# Patient Record
Sex: FEMALE | Race: BLACK OR AFRICAN AMERICAN | Employment: OTHER | URBAN - METROPOLITAN AREA
[De-identification: names, ages, dates, MRNs, and addresses within clinical notes are randomized per-mention and may not be internally consistent; named-entity substitution may affect disease eponyms.]

---

## 2019-01-28 ENCOUNTER — PATIENT OUTREACH (OUTPATIENT)
Dept: INTERNAL MEDICINE CLINIC | Age: 84
End: 2019-01-28

## 2019-01-28 NOTE — PROGRESS NOTES
NN received Staff Message sent to PCP from [facility] regarding patient being admitted inpatient at [facility].  
 Message states:   
Michelle Mora is requesting Dr. Annelise Denson call Pratibha Lubin () with [facility] [phone] in regards of the patient. Best contact is [phone]. SHABANA LM on  for both above # requesting call back.

## 2019-01-31 ENCOUNTER — HOSPITAL ENCOUNTER (EMERGENCY)
Age: 84
Discharge: HOME | End: 2019-01-31
Attending: EMERGENCY MEDICINE
Payer: MEDICARE

## 2019-01-31 ENCOUNTER — PATIENT OUTREACH (OUTPATIENT)
Dept: INTERNAL MEDICINE CLINIC | Age: 84
End: 2019-01-31

## 2019-01-31 VITALS
DIASTOLIC BLOOD PRESSURE: 46 MMHG | HEART RATE: 63 BPM | TEMPERATURE: 97.8 F | RESPIRATION RATE: 19 BRPM | OXYGEN SATURATION: 100 % | SYSTOLIC BLOOD PRESSURE: 130 MMHG

## 2019-01-31 DIAGNOSIS — R55 SYNCOPE, UNSPECIFIED SYNCOPE TYPE: Primary | ICD-10-CM

## 2019-01-31 LAB
ALBUMIN SERPL-MCNC: 3.4 G/DL (ref 3.5–5)
ALBUMIN/GLOB SERPL: 0.9 {RATIO} (ref 1.1–2.2)
ALP SERPL-CCNC: 78 U/L (ref 45–117)
ALT SERPL-CCNC: 16 U/L (ref 12–78)
ANION GAP SERPL CALC-SCNC: 10 MMOL/L (ref 5–15)
AST SERPL-CCNC: 14 U/L (ref 15–37)
ATRIAL RATE: 52 BPM
BASOPHILS # BLD: 0.1 K/UL (ref 0–0.1)
BASOPHILS NFR BLD: 1 % (ref 0–1)
BILIRUB SERPL-MCNC: 0.4 MG/DL (ref 0.2–1)
BUN SERPL-MCNC: 26 MG/DL (ref 6–20)
BUN/CREAT SERPL: 19 (ref 12–20)
CALCIUM SERPL-MCNC: 9 MG/DL (ref 8.5–10.1)
CALCULATED P AXIS, ECG09: 64 DEGREES
CALCULATED R AXIS, ECG10: 46 DEGREES
CALCULATED T AXIS, ECG11: 60 DEGREES
CHLORIDE SERPL-SCNC: 114 MMOL/L (ref 97–108)
CO2 SERPL-SCNC: 21 MMOL/L (ref 21–32)
COMMENT, HOLDF: NORMAL
CREAT SERPL-MCNC: 1.34 MG/DL (ref 0.55–1.02)
DIAGNOSIS, 93000: NORMAL
DIFFERENTIAL METHOD BLD: ABNORMAL
EOSINOPHIL # BLD: 0.2 K/UL (ref 0–0.4)
EOSINOPHIL NFR BLD: 2 % (ref 0–7)
ERYTHROCYTE [DISTWIDTH] IN BLOOD BY AUTOMATED COUNT: 13.6 % (ref 11.5–14.5)
GLOBULIN SER CALC-MCNC: 4 G/DL (ref 2–4)
GLUCOSE SERPL-MCNC: 111 MG/DL (ref 65–100)
HCT VFR BLD AUTO: 30.4 % (ref 35–47)
HGB BLD-MCNC: 9.5 G/DL (ref 11.5–16)
IMM GRANULOCYTES # BLD AUTO: 0 K/UL (ref 0–0.04)
IMM GRANULOCYTES NFR BLD AUTO: 0 % (ref 0–0.5)
LYMPHOCYTES # BLD: 2 K/UL (ref 0.8–3.5)
LYMPHOCYTES NFR BLD: 24 % (ref 12–49)
MCH RBC QN AUTO: 30 PG (ref 26–34)
MCHC RBC AUTO-ENTMCNC: 31.3 G/DL (ref 30–36.5)
MCV RBC AUTO: 95.9 FL (ref 80–99)
MONOCYTES # BLD: 0.5 K/UL (ref 0–1)
MONOCYTES NFR BLD: 6 % (ref 5–13)
NEUTS SEG # BLD: 5.6 K/UL (ref 1.8–8)
NEUTS SEG NFR BLD: 67 % (ref 32–75)
NRBC # BLD: 0 K/UL (ref 0–0.01)
NRBC BLD-RTO: 0 PER 100 WBC
P-R INTERVAL, ECG05: 112 MS
PLATELET # BLD AUTO: 267 K/UL (ref 150–400)
PMV BLD AUTO: 9.6 FL (ref 8.9–12.9)
POTASSIUM SERPL-SCNC: 4.4 MMOL/L (ref 3.5–5.1)
PROT SERPL-MCNC: 7.4 G/DL (ref 6.4–8.2)
Q-T INTERVAL, ECG07: 484 MS
QRS DURATION, ECG06: 70 MS
QTC CALCULATION (BEZET), ECG08: 450 MS
RBC # BLD AUTO: 3.17 M/UL (ref 3.8–5.2)
SAMPLES BEING HELD,HOLD: NORMAL
SODIUM SERPL-SCNC: 145 MMOL/L (ref 136–145)
TROPONIN I SERPL-MCNC: <0.05 NG/ML
VENTRICULAR RATE, ECG03: 52 BPM
WBC # BLD AUTO: 8.2 K/UL (ref 3.6–11)

## 2019-01-31 PROCEDURE — 99284 EMERGENCY DEPT VISIT MOD MDM: CPT

## 2019-01-31 PROCEDURE — 84484 ASSAY OF TROPONIN QUANT: CPT

## 2019-01-31 PROCEDURE — 97161 PT EVAL LOW COMPLEX 20 MIN: CPT

## 2019-01-31 PROCEDURE — 97530 THERAPEUTIC ACTIVITIES: CPT

## 2019-01-31 PROCEDURE — 97116 GAIT TRAINING THERAPY: CPT

## 2019-01-31 PROCEDURE — 36415 COLL VENOUS BLD VENIPUNCTURE: CPT

## 2019-01-31 PROCEDURE — 85025 COMPLETE CBC W/AUTO DIFF WBC: CPT

## 2019-01-31 PROCEDURE — 80053 COMPREHEN METABOLIC PANEL: CPT

## 2019-01-31 PROCEDURE — 93005 ELECTROCARDIOGRAM TRACING: CPT

## 2019-01-31 RX ORDER — GABAPENTIN 300 MG/1
300 CAPSULE ORAL 2 TIMES DAILY
COMMUNITY
End: 2019-04-16 | Stop reason: ALTCHOICE

## 2019-01-31 RX ORDER — DONEPEZIL HYDROCHLORIDE 10 MG/1
10 TABLET, FILM COATED ORAL
COMMUNITY
End: 2019-02-15 | Stop reason: SDUPTHER

## 2019-01-31 RX ORDER — LOSARTAN POTASSIUM 100 MG/1
100 TABLET ORAL DAILY
COMMUNITY
End: 2019-04-16 | Stop reason: ALTCHOICE

## 2019-01-31 RX ORDER — LABETALOL 200 MG/1
200 TABLET, FILM COATED ORAL 2 TIMES DAILY
COMMUNITY
End: 2019-03-04 | Stop reason: SDUPTHER

## 2019-01-31 RX ORDER — NIFEDIPINE 60 MG/1
60 TABLET, EXTENDED RELEASE ORAL DAILY
COMMUNITY
End: 2019-02-04

## 2019-01-31 RX ORDER — GUAIFENESIN 100 MG/5ML
81 LIQUID (ML) ORAL DAILY
COMMUNITY
End: 2019-04-16 | Stop reason: ALTCHOICE

## 2019-01-31 RX ORDER — POLYETHYLENE GLYCOL 3350 17 G/17G
17 POWDER, FOR SOLUTION ORAL
COMMUNITY
End: 2019-04-16 | Stop reason: ALTCHOICE

## 2019-01-31 RX ORDER — OMEPRAZOLE 20 MG/1
20 CAPSULE, DELAYED RELEASE ORAL DAILY
COMMUNITY
End: 2019-04-16 | Stop reason: ALTCHOICE

## 2019-01-31 NOTE — ED TRIAGE NOTES
Patient presents from home with complaints of ongoing dizziness that started a few weeks ago. Patient fell last week in [place] and was in the hospital until Saturday. Since patient has been discharged she is still dizzy and reports increased weakness at this time

## 2019-01-31 NOTE — ED PROVIDER NOTES
80 y.o. female with past medical history significant for HTN and stroke who presents from home with chief complaint of syncope. Pt's daughter reports Pt became dizzy while riding to their hair salon, so she lied flat in the backseat and rested her head on an armrest at that time. Per daughter, Pt was generally weak when she arrived to the salon; she required assistance from both her daughter and son to get inside to the salon chair. After sitting down, Pt slumped to one side in the chair, she was awake but \"unable to speak or answer questions\", and she was \"trembling all over\". Pt was then assisted back into the daughter's vehicle, then was brought to the ED. Per daughter, Pt has been admitted at [facility] twice in 1 month. The first admission was for a syncopal episode and fluctuating BP; she was then admitted for a R ankle fracture 12 days ago. Per daughter, Pt was discharged from [facility] 6 days ago and she arrived to [place] 5 days ago. In addition, Pt was recently on abx for UTI. Pt's daughter reports Pt had a R-sided stroke in December 2014. There are no other acute medical concerns at this time. PCP: Lisha Turcios MD 
 
Note written by Jose A Armendariz, as dictated by Warren Cárdenas MD 3:00 PM 
 
 
 
  
 
Past Medical History:  
Diagnosis Date  Hypertension History reviewed. No pertinent surgical history. History reviewed. No pertinent family history. Social History Socioeconomic History  Marital status: UNKNOWN Spouse name: Not on file  Number of children: Not on file  Years of education: Not on file  Highest education level: Not on file Social Needs  Financial resource strain: Not on file  Food insecurity - worry: Not on file  Food insecurity - inability: Not on file  Transportation needs - medical: Not on file  Transportation needs - non-medical: Not on file Occupational History  Not on file Tobacco Use  
  Smoking status: Never Smoker Substance and Sexual Activity  Alcohol use: No  
 Drug use: No  
 Sexual activity: Not Currently Other Topics Concern  Not on file Social History Narrative  Not on file ALLERGIES: Pcn [penicillins] Review of Systems Constitutional: Negative for appetite change, chills and fever. HENT: Negative for rhinorrhea, sore throat and trouble swallowing. Eyes: Negative for photophobia. Respiratory: Negative for cough and shortness of breath. Cardiovascular: Negative for chest pain and palpitations. Gastrointestinal: Negative for abdominal pain, nausea and vomiting. Genitourinary: Negative for dysuria, frequency and hematuria. Musculoskeletal: Negative for arthralgias. Neurological: Positive for dizziness, tremors, speech difficulty and weakness. Psychiatric/Behavioral: Negative for behavioral problems. The patient is not nervous/anxious. All other systems reviewed and are negative. Vitals:  
 01/31/19 1439 01/31/19 1530 BP: 112/47 116/43 Pulse: (!) 58 63 Resp: 20 18 Temp: 97.5 °F (36.4 °C) SpO2: 100% 98% Physical Exam  
Constitutional: She appears well-developed and well-nourished. Plastic boot covering R foot. HENT:  
Head: Normocephalic and atraumatic. Mouth/Throat: Oropharynx is clear and moist.  
(+) Thrush on tongue. Eyes: EOM are normal. Pupils are equal, round, and reactive to light. Neck: Normal range of motion. Neck supple. Cardiovascular: Regular rhythm, normal heart sounds and intact distal pulses. Bradycardia present. Exam reveals no gallop and no friction rub. No murmur heard. Pulse rate 55. Pulmonary/Chest: Effort normal. No respiratory distress. She has no wheezes. She has no rales. Abdominal: Soft. There is no tenderness. There is no rebound. Musculoskeletal: Normal range of motion. She exhibits no tenderness. Neurological: She is alert. No cranial nerve deficit. Motor; symmetric Skin: No erythema. Psychiatric: She has a normal mood and affect. Her behavior is normal.  
Nursing note and vitals reviewed. Note written by Jose A Montgomery, as dictated by Larri Dubin, MD 3:00 PM 
 
MDM Procedures ED EKG interpretation: 
Rhythm: sinus bradycardia; and regular . Rate (approx.): 50; Axis: normal; P wave: normal; QRS interval: normal ; ST/T wave: normal; in  Lead: ; Other findings: . This EKG was interpreted by General Luc MD,ED Provider. 3:07 PM 
 
 
Note: Patient is here with her daughter. She had been living with her daughter in [place]. She was discharged from the hospital there 6 days ago. She's had two admissions for spells that sound like syncope in the last month. Patient started feeling poorly in the car on the way to the . She put her head down on the armrest. Her daughter and son helped her in to the Group-[facility] parlor. Patient then had a spell that sounds like a faint. She appears to be back to normal now. Neurologic exam is normal and she is completely oriented. Vital signs are normal. I do not hear a heart murmur. Daughter reports that due to her frequent spells that she will need to be placed.  will be consulted. Labs will be sent. Her vital signs will be continued to be monitored. Pulse rate an EKG was 50 but she appears to be taking labetalol. There was no trauma when she had her spell today although she broke her ankle recently.  
General Luc MD 
3:13 PM

## 2019-01-31 NOTE — PROGRESS NOTES
Admission Medication Reconciliation: 
 
Information obtained from: discharge papers from [facility] dated 1/26/19 Significant PMH/Disease States:  
Past Medical History:  
Diagnosis Date  Hypertension Chief Complaint for this Admission:  dizziness, weakness Allergies:  Pcn [penicillins] Prior to Admission Medications:  
Prior to Admission Medications Prescriptions Last Dose Informant Patient Reported? Taking? NIFEdipine ER (PROCARDIA XL) 60 mg ER tablet   Yes Yes Sig: Take 60 mg by mouth daily. aspirin 81 mg chewable tablet   Yes Yes Sig: Take 81 mg by mouth daily. donepezil (ARICEPT) 10 mg tablet   Yes Yes Sig: Take 10 mg by mouth nightly.  
gabapentin (NEURONTIN) 300 mg capsule   Yes Yes Sig: Take 300 mg by mouth two (2) times a day. labetalol (NORMODYNE) 200 mg tablet   Yes Yes Sig: Take 200 mg by mouth two (2) times a day. losartan (COZAAR) 100 mg tablet   Yes Yes Sig: Take 100 mg by mouth daily. methyl salicylate/menthol (CARSON BYERS EX)   Yes Yes Sig: by Apply Externally route three (3) times daily as needed for Pain.  
montelukast (SINGULAIR) 10 mg tablet   Yes Yes Sig: Take 10 mg by mouth daily. multivitamins-minerals-lutein (CENTRUM SILVER) Tab   Yes Yes Sig: Take 1 Tab by mouth. omeprazole (PRILOSEC) 20 mg capsule   Yes Yes Sig: Take 20 mg by mouth daily. polyethylene glycol (MIRALAX) 17 gram packet   Yes Yes Sig: Take 17 g by mouth daily as needed. Facility-Administered Medications: None Comments/Recommendations: Medication review done with discharge paperwork from [facility] dated 1/26/19 Copy left for scanning. Removed amlodipine, carvedilol, levetiracetam, valsartan, hydrcortisone cream 
Added aspirin, donepezil, gabapentin, labetalol, losartan, nifedipine, omeprazole, Carson Byers 
allergies reviewed. Consider multiple medications as possible causes for dizziness.

## 2019-01-31 NOTE — PROGRESS NOTES
Date of previous inpatient admission/ ED visit? 12/15/14-12/19/18 What brought the patient back to ED? Patient presents to the ED w/ dizziness Did patient decline recommended services during last admission/ ED visit (if yes, what)? No 
 
Has patient seen a provider since their last inpatient admission/ED visit (if yes, when)? Yes. Patient has been residing in [facility] since 2014 and has been receiving care. Recent hospital d/c 1/26/19 CM Interventions: 
From previous inpatient admission/ED visit:NA From current inpatient admission/ED visit: Assessment SSED/CM consult received and appreciated. EMR reviewed. History significant for HTN and  CVA. Patient presents to the ED w/ dizziness. Initially met w/ Ms. Domenico Last as daughter to return back. Introduced to role of CM. Patient states was in the car and wasn't able to keep her head up noted leaning to the side. Later had episode in hair salon. Daughter Adeola Dumont arrived and provided additional information patient brought from [facility] to be with rest of family.  had resided in [place] since 2014 w/ daughter however now Adeola Dumont experiencing health challenges and prefers for her mother to be in [place] w/ Nanette Harris son/MPOA and rest of family members. 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  states [employer] to switch from [place] to [facility] at midnight. Provided CM w/ paperwork and verified subscriber number is the same [id number] however RX numbers are different (effective 1/1/19). Daughter to return to [facility] on 2/6/19 and trying to assist w/ processes before departure. DME includes w/c and cane. Discussed LTC resources after possible placement. CM asked about private resources for NH - Ms. Domenico Last does not have a LTC policy and would need to apply for medicaid for NH. This writer discussed the need to apply for medicaid and process will take 45 days for decision. Noted recommendations by Kaylee Eaton. CM provided list of [facility] agencies / senior connections/ and SNFs. Contact made w/ 's office only facility MD has privileges is [facility] - family expressed distance and ability for family to reach is important. Discussed [facility] however informed [employer] would only review one disposition SNF versus [facility].  elects to pursue SNF will discuss choices w/ Cheng and contact CM on 2/1/19. Home Health and SNF Lists provided and Freedom Letter reviewed and signed. Contact made w/ SHABANA Hernadez. Copy of AMD obtained and scanned in MR. No additional transitional care needs verbalized. Care Management Interventions PCP Verified by CM: Yes Last Visit to PCP: 11/10/14 Palliative Care Criteria Met (RRAT>21 & CHF Dx)?: No 
Transition of Care Consult (CM Consult): Discharge Planning MyChart Signup: No 
Discharge Durable Medical Equipment: No 
Health Maintenance Reviewed: Yes Physical Therapy Consult: Yes Occupational Therapy Consult: No 
Speech Therapy Consult: No 
Current Support Network: Relative's Home Confirm Follow Up Transport: Family Plan discussed with Pt/Family/Caregiver: Yes Freedom of Choice Offered: Yes [employer] Information Provided?: No 
Discharge Location Discharge Placement: Home with home health

## 2019-01-31 NOTE — PROGRESS NOTES
[address] [facility] Support & Risk Prevention:  Fainting Goals Addressed This Visit's Progress  Facilitate transitions of care -Emergency Care. On track 1/31/2019:  NN spoke with POA Daughter Tiffanie Wills who c/o patient just fainted \"again\"; that they are headed to ED due to episode. Stated she realizes PCP recommends [facility] but she has to do what is best for the family (son) who will care for patient after her leaving to go back to [facility] in 2 days. States patient's son lives in [place] and she has to think of money, milage & time for family to see patient frequently when and where admitted including post d/c Rehab. NN explained the location and Practice protocol if patient is admitted to [facility]; pt would be followed by partner Dr. Vane Villanueva; That if patient attends [facility] and if required SNF, referral could be make by PCP to facility of choice in [facility] pending Payor's agreement. Family member states she is contacting Payor to if [facility], etc  on the list. Also informed if patient is admitted to [facility], patient w/ be followed by PCP. States she will decide based on distance, money and location best for the brother/family now assuming respoonsiblity of patient. Consult done w/ PCP. Goal completion:  Pending ED visit

## 2019-01-31 NOTE — SENIOR SERVICES NOTE
physical Therapy Emergency Department EVALUATION Patient: Ayleen López (89 y.o. female) Date: 1/31/2019 Primary Diagnosis: No admission diagnoses are documented for this encounter. Precautions: Fall risk ASSESSMENT : 
Chart reviewed. Patient cleared to be seen by ER MD. Patient presents with dizziness. Difficult social history. From [place] where she was recently hospitalized s/p R ankle fracture. She is in a walking boot. Daughter present who has medical concerns of her own, prompting the move to [facility] to be with other family members. Patient orthostatic in standing. With LE exercise able to recover and ambulate in the hallway with the RW. Patient is unsteady with the cane and encouraged to use RW at home. No pain with walking. Slow but steady gait pattern with the device. Of note, daughter reporting that the patient c/o of calf pain on the R. Walking boot removed. No pain, redness, or swelling observed. Readjusted straps on the boot. Educated on safe stair negotiation as well as sitting and standing two full minutes before moving away from the bed to avoid anymore orthostatic episodes. Discussed with MD and case management. Recommend home with continued family support and HHPT/OT. PLAN : 
Frequency/Duration: Pending admission, the patient will be followed by physical therapy 5 times a week to address goals. If admitted, Recommendations and Planned Interventions: 
[x]           Bed Mobility Training             []    Neuromuscular Re-Education 
[x]           Transfer Training                   []    Orthotic/Prosthetic Training 
[x]           Gait Training                         []    Modalities [x]           Therapeutic Exercises           []    Edema Management/Control 
[x]           Therapeutic Activities            []    Patient and Family Training/Education 
[]           Other (comment): 
 
 
Discharge Recommendations:  
 
[x]   Home with family []   Skilled nursing facility []   Admission to hospital with rehab likely needed 
[]   Inpatient rehab referral 
[]   Outpatient physical therapy referral 
[x]   Other: HHPT and OT Further Equipment Recommendations for Discharge:  Patient encouraged to use RW rather than the cane secondary to high fall risk [x]   Rolling walker with 5\" wheels 
[]   Crutches  
[]   Cane  
[]   Wheelchair  
[]   Other: COMMUNICATION/EDUCATION:  
Communication/Collaboration: 
[]   Fall prevention education was provided and the patient/caregiver indicated understanding. []   Patient/family have participated as able and agree with findings and recommendations. []   Patient is unable to participate in plan of care at this time. Findings and recommendations were discussed with: MD physician and  SUBJECTIVE:  
Patient stated I am doing the best that I can.  OBJECTIVE DATA SUMMARY:  
HISTORY:   
Past Medical History:  
Diagnosis Date  Hypertension History reviewed. No pertinent surgical history. Prior Level of Function/Home Situation: Patient recently moved to [place] post hospitalization in [place]. Living with family members who provide constant support. Ambulates with a cane and R walking boot s/p fracture on R ankle in [place] from a fall. Personal factors and/or comorbidities impacting plan of care:  
 
Home Situation Home Environment: Private residence # Steps to Enter: 3 Rails to Enter: Yes Hand Rails : Bilateral 
One/Two Story Residence: One story Living Alone: No 
Support Systems: Family member(s) Patient Expects to be Discharged to[de-identified] Private residence Current DME Used/Available at Home: Walker, rolling, Cane, straight EXAMINATION/PRESENTATION/DECISION MAKING:  
Hearing: Auditory Auditory Impairment: None Strength:   
Strength: Generally decreased, functional 
  
Tone & Sensation:  
Tone: Normal 
Sensation: Intact Coordination: 
Coordination: Within functional limits Functional Mobility: Bed Mobility: 
Supine to Sit: Contact guard assistance Sit to Supine: Contact guard assistance Transfers: 
Sit to Stand: Contact guard assistance; Adaptive equipment; Additional time Stand to Sit: Contact guard assistance Balance:  
Sitting: Intact Standing: Impaired Standing - Static: Fair;Constant support Standing - Dynamic : Fair Ambulation/Gait Training: 
Distance (ft): 60 Feet (ft) Assistive Device: Gait belt;Walker, rolling Ambulation - Level of Assistance: Contact guard assistance; Additional time; Adaptive equipment Gait Abnormalities: Decreased step clearance Base of Support: Narrowed Speed/Sarai: Slow Special Tests: 
10 Meter walk test:  (Specify if any supplemental oxygen is used, the type, pre, during and post sats.) Self-Selected Or Fast-Velocity: Self Selected Velocity Trial 1 (Time to Walk 10 Meters): 29 Seconds Trial 2 (Time to Walk 10 Meters): 29 Seconds Trial 3 (Time to Walk 10 Meters): 29 Seconds Average : 29 Seconds Score (Meters/Second): 0.3 Meters/Second Minimal Detectable Change (MDC-90) = 0.1 m/s Jeff NAZARIO. \"Comfortable and maximum walking speed of adults aged 20-79 years: reference values and determinants. \" Age and Agin Volume 26(1):15-9. Emily Falk. \"Age- and gender-related test performance in community-dwelling elderly people: Six-Minute Walk Test, Bui Balance Scale, Timed Up & Go Test, and gait speeds. \" Physical Therapy: 2002 Volume 82(2):128-37. Alice Nobles DM, Suzette PW, Lashon Monson GARRETT, [facility] MONISHA. \"Assessing stability and change of four performance measures: a longitudinal study evaluating outcome following total hip and knee arthroplasty. \" [facility] Musculoskeletal Disorders: 2005 Volume 6(3). Sridhar Mendoza, PhD; Feli Gomez, PhD. Lindy Sanchez Paper: \"Walking Speed: the Sixth Vital Sign\" Journal of Geriatric Physical Therapy: 2009 - Volume 32 - Issue 2 - p 25 . Physical Therapy Evaluation Charge Determination History Examination Presentation Decision-Making MEDIUM  Complexity : 1-2 comorbidities / personal factors will impact the outcome/ POC  LOW Complexity : 1-2 Standardized tests and measures addressing body structure, function, activity limitation and / or participation in recreation  LOW Complexity : Stable, uncomplicated  LOW Complexity : FOTO score of  Based on the above components, the patient evaluation is determined to be of the following complexity level: LOW Pain: 
Pain Scale 1: Numeric (0 - 10) Pain Intensity 1: 0 Activity Tolerance:  
Orthostatic. BP from 112/52 in sitting to 87/52 in standing. Patient reporting some \"funny feeling\". Seated and with leg exercises about to recover and ambulate. Please refer to the flowsheet for vital signs taken during this treatment. After treatment:  
[]   Patient left in no apparent distress sitting up in chair 
[]   Patient left in no apparent distress in bed 
[]   Call bell left within reach 
[]   Nursing notified 
[]   Caregiver present 
[]   Bed alarm activated Thank you for this referral. 
Hilary Montano, PT Time Calculation: 35 mins

## 2019-01-31 NOTE — DISCHARGE INSTRUCTIONS
Patient Education        Fainting: Care Instructions  Your Care Instructions    When you faint, or pass out, you lose consciousness for a short time. A brief drop in blood flow to the brain often causes it. When you fall or lie down, more blood flows to your brain and you regain consciousness. Emotional stress, pain, or overheating--especially if you have been standing--can make you faint. In these cases, fainting is usually not serious. But fainting can be a sign of a more serious problem. Your doctor may want you to have more tests to rule out other causes. The treatment you need depends on the reason why you fainted. The doctor has checked you carefully, but problems can develop later. If you notice any problems or new symptoms, get medical treatment right away. Follow-up care is a key part of your treatment and safety. Be sure to make and go to all appointments, and call your doctor if you are having problems. It's also a good idea to know your test results and keep a list of the medicines you take. How can you care for yourself at home? · Drink plenty of fluids to prevent dehydration. If you have kidney, heart, or liver disease and have to limit fluids, talk with your doctor before you increase your fluid intake. When should you call for help? Call 911 anytime you think you may need emergency care. For example, call if:    · You have symptoms of a heart problem. These may include:  ? Chest pain or pressure. ? Severe trouble breathing. ? A fast or irregular heartbeat. ? Lightheadedness or sudden weakness. ? Coughing up pink, foamy mucus. ? Passing out. After you call 911, the  may tell you to chew 1 adult-strength or 2 to 4 low-dose aspirin. Wait for an ambulance. Do not try to drive yourself.     · You have symptoms of a stroke. These may include:  ? Sudden numbness, tingling, weakness, or loss of movement in your face, arm, or leg, especially on only one side of your body.   ? Sudden vision changes. ? Sudden trouble speaking. ? Sudden confusion or trouble understanding simple statements. ? Sudden problems with walking or balance. ? A sudden, severe headache that is different from past headaches.     · You passed out (lost consciousness) again.    Watch closely for changes in your health, and be sure to contact your doctor if:    · You do not get better as expected. Where can you learn more? Go to http://rosalina-bonifacio.info/. Enter J151 in the search box to learn more about \"Fainting: Care Instructions. \"  Current as of: September 23, 2018  Content Version: 11.9  © 3989-3404 [employer]. Care instructions adapted under license by [employer] (which disclaims liability or warranty for this information). If you have questions about a medical condition or this instruction, always ask your healthcare professional. [address] any warranty or liability for your use of this information.

## 2019-01-31 NOTE — SENIOR SERVICES NOTE
Face to Face Encounter Patients Name: Asia Roe    YOB: 1929 Primary Diagnosis: dizziness Date of Face to Face:   1/31/2019 Face to Face Encounter findings are related to primary reason for home care:   yes 1. I certify that the patient needs intermittent skilled nursing care, physical therapy and/or speech therapy. I will not be following this patient in the Community and Dr. Charisma Garcia will be responsible for signing the [address] 2. Initial Orders for Care: Must be completed only if Face to Face MD will not be signing the [address] Physical Therapy, Occupational Therapy and Medical  3. I certify that this patient is homebound for the following reason(s): Requires considerable and taxing effort to leave the home  and Requires the assistance of 1 or more persons to leave the home 4. I certify that this patient is under my care and that I, or a nurse practitioner or [phone] working with me, had a Face-to-Face Encounter that meets the physician Face-to-Face Encounter requirements. Document the physical findings from the [address] [place] [address] Street Encounter that support the need for skilled services: Needs skilled safety assessment and interventions  and Has new finding of weakness and altered mobility that requires skilled physical/occupational and/or speech therapy services for evaluation and interventions. Dawna Valdez, PT 
1/31/2019

## 2019-02-01 ENCOUNTER — TELEPHONE (OUTPATIENT)
Dept: CASE MANAGEMENT | Age: 84
End: 2019-02-01

## 2019-02-01 ENCOUNTER — PATIENT OUTREACH (OUTPATIENT)
Dept: INTERNAL MEDICINE CLINIC | Age: 84
End: 2019-02-01

## 2019-02-01 ENCOUNTER — DOCUMENTATION ONLY (OUTPATIENT)
Dept: CASE MANAGEMENT | Age: 84
End: 2019-02-01

## 2019-02-01 NOTE — TELEPHONE ENCOUNTER
Spoke w/ MPOA /Daughter Casey Thomason to discuss options - states cancelled MD appointment when thought patient was to be admitted into the hospital. CM verbalized will need PCP to follow regarding [facility] orders. CM attempted to look at other options for patient.  verbalizes patient was to be placed in [facility] however plan was to return back to [place] to be w/ family and previously had 3 night hospitalization. Ms. Perico Hemphill has active PCP in [facility]. CM asked about additional family to provide assist in the home during processes (retired / college student) - no additional available family. Daughter questions if should return back to [facility] w/ resources and re attempt processes there. CM acknowledges IPR is not a LTC solution average stay 1-2 weeks. No present [facility] services including therapy recommending LOC needs. Patient evaluated in the ED on 1/31/19. CM asked daughter to contact Hannah De Guzman regarding available appointments for today/ Monday for possible HH orders. Obtained choice of facilities - Haris Rickey communicated w/ brother Kelsy Calderón 1/ [address] 2/[facility] and [facility]. Informed insurance would need documentation for authorization. Asked patient's monthly income $1400 Social Security. Requested Ms. Parra to initiate medicaid application today informed private pay for SNFs >$6000/month. Permission obtained to send referral to [facility] and will obtain rates and communicate back to [facility]. Call placed to [facility] - spoke w/ Alpha Deniz [phone] discussed case and willing to review. Non skill rates start at $298/day private upfront monthly $15,000.

## 2019-02-01 NOTE — TELEPHONE ENCOUNTER
Call placed to [place] Department at [facility] [phone] message left for Marielena Vargas CM (outcome pending).

## 2019-02-01 NOTE — PROGRESS NOTES
Call received from Daniel Smyth, [address] Liaison informed [facility] unable to follow family declined PCP visit no MD to follow.

## 2019-02-01 NOTE — PROGRESS NOTES
Contact made w/ [facility] Liaison Corey BOWIE. Order not seen in system CM will need to resend. Referral placed in CC today. Emergency contacts daughter/ Jose Lowe [phone] and son/ Meena Altamiranoes [phone]

## 2019-02-01 NOTE — PROGRESS NOTES
NNTOCED [facility]M 2019:  Dizziness/Falling/Syncope Hospital Discharge Follow-Up Date/Time:  2019 3:18 PM 
 
Patient was admitted to [facility] ED on 2019 and discharged on 2019 for Syncope- Neurological: Positive for dizziness, tremors, speech difficulty and weakness . The physician discharge summary was available at the time of outreach. Patient was contacted within 2 business days of discharge. Top Challenges reviewed with the provider Office Visit Patient d/c from [facility] in [facility] 7 days ago ACP needs updating Method of communication with provider :face to face Inpatient RRAT score: 5 Was this a readmission? no  
Patient stated reason for the readmission: n/a Nurse Navigator (NN) contacted the family by telephone to perform post hospital discharge assessment. Verified name and  with family as identifiers. Provided introduction to self, and explanation of the Nurse Navigator role. Reviewed discharge instructions and red flags with family who verbalized understanding. Family given an opportunity to ask questions and does not have any further questions or concerns at this time. The family agrees to contact the PCP office for questions related to their healthcare. NN provided contact information for future reference. Disease Specific:   none Summary of patient's top problems: 1. Syncope episodes Home Health orders at discharge: none Home Health company: no 
Date of initial visit: n/a Durable Medical Equipment ordered/company: no 
Durable Medical Equipment received: n/a Barriers to care? none Advance Care Planning:  
Does patient have an Advance Directive: On file-needs updating. Medication(s):  
New Medications at Discharge: none-the patient was taking the same prior to admissions. Changed Medications at Discharge: n/a Discontinued Medications at Discharge: non3 Medication reconciliation was performed with family, who verbalizes understanding of administration of home medications. There were no barriers to obtaining medications identified at this time. Referral to Pharm D needed: no  
 
Current Outpatient Medications Medication Sig  
 aspirin 81 mg chewable tablet Take 81 mg by mouth daily.  NIFEdipine ER (PROCARDIA XL) 60 mg ER tablet Take 60 mg by mouth daily.  labetalol (NORMODYNE) 200 mg tablet Take 200 mg by mouth two (2) times a day.  donepezil (ARICEPT) 10 mg tablet Take 10 mg by mouth nightly.  gabapentin (NEURONTIN) 300 mg capsule Take 300 mg by mouth two (2) times a day.  losartan (COZAAR) 100 mg tablet Take 100 mg by mouth daily.  omeprazole (PRILOSEC) 20 mg capsule Take 20 mg by mouth daily.  polyethylene glycol (MIRALAX) 17 gram packet Take 17 g by mouth daily as needed.  methyl salicylate/menthol (CARSON BYERS EX) by Apply Externally route three (3) times daily as needed for Pain.  montelukast (SINGULAIR) 10 mg tablet Take 10 mg by mouth daily.  multivitamins-minerals-lutein (CENTRUM SILVER) Tab Take 1 Tab by mouth. No current facility-administered medications for this visit. There are no discontinued medications. [facility] follow up appointment(s): No future appointments. Non-[facility] follow up appointment(s): no 
[facility]:  n/a  
 
 
Goals  Attends follow up appointments on schedule 2/1/2019:  Patient scheduled for JOANNA visit 2/4/2019. EW  
  
  Facilitate transitions of care -Emergency Care.   
  1/31/2019:  NN spoke with POA Daughter Eddi who c/o patient just fainted \"again\"; that they are headed to ED due to episode. Stated she realizes PCP recommends [facility] but she has to do what is best for the family (son) who will care for patient after her leaving to go back to [facility] in 2 days.   States patient's son lives in [place] and she has to think of money, milage & time for family to see patient frequently when and where admitted including post d/c Rehab. NN explained the location and Practice protocol if patient is admitted to [facility]; pt would be followed by partner Dr. Elodia Abad; That if patient attends [address] [place] [address] and if required SNF, referral could be make by PCP to facility of choice in [facility] pending Payor's agreement. Family member states she is contacting Payor to if [facility], etc  on the list. Also informed if patient is admitted to [facility], patient w/ be followed by PCP. States she will decide based on distance, money and location best for the brother/family now assuming respoonsiblity of patient. Consult done w/ PCP. date completion:  2/4/2019

## 2019-02-01 NOTE — TELEPHONE ENCOUNTER
Call received from Ms. Parra informed of PCP appointment for 2/4/19. This writer provided call to [facility] CM Department VM left. Daughter to also try to reach [facility]. Discussed family will need to make decision beginning of next week regarding more appropriate [place] (outcome pending). Electronic communication sent to GIULIANA Vergara.

## 2019-02-04 ENCOUNTER — PATIENT OUTREACH (OUTPATIENT)
Dept: INTERNAL MEDICINE CLINIC | Age: 84
End: 2019-02-04

## 2019-02-04 ENCOUNTER — OFFICE VISIT (OUTPATIENT)
Dept: INTERNAL MEDICINE CLINIC | Age: 84
End: 2019-02-04

## 2019-02-04 VITALS
SYSTOLIC BLOOD PRESSURE: 89 MMHG | WEIGHT: 99.9 LBS | HEART RATE: 70 BPM | BODY MASS INDEX: 18.38 KG/M2 | DIASTOLIC BLOOD PRESSURE: 49 MMHG | OXYGEN SATURATION: 98 % | TEMPERATURE: 97.8 F | RESPIRATION RATE: 18 BRPM | HEIGHT: 62 IN

## 2019-02-04 DIAGNOSIS — R41.89 UNRESPONSIVE EPISODE: ICD-10-CM

## 2019-02-04 DIAGNOSIS — M25.551 HIP PAIN, RIGHT: ICD-10-CM

## 2019-02-04 DIAGNOSIS — S82.891D CLOSED FRACTURE OF RIGHT ANKLE WITH ROUTINE HEALING, SUBSEQUENT ENCOUNTER: ICD-10-CM

## 2019-02-04 DIAGNOSIS — I69.910 ATTENTION AND CONCENTRATION DEFICIT FOLLOWING UNSPECIFIED CEREBROVASCULAR DISEASE: ICD-10-CM

## 2019-02-04 DIAGNOSIS — K21.9 GASTROESOPHAGEAL REFLUX DISEASE WITHOUT ESOPHAGITIS: ICD-10-CM

## 2019-02-04 DIAGNOSIS — I10 ESSENTIAL HYPERTENSION: Primary | ICD-10-CM

## 2019-02-04 NOTE — PROGRESS NOTES
1. Have you been to the ER, urgent care clinic since your last visit? Hospitalized since your last visit? Yes When: 2-1-19 Where: [facility] Reason for visit: fall 2. Have you seen or consulted any other health care providers outside of the [address] [place] [address] since your last visit? Include any pap smears or colon screening. No  
 
Wants to discuss right hip pain

## 2019-02-04 NOTE — PROGRESS NOTES
SPORTS MEDICINE AND PRIMARY CARE Gayatri Ramirez MD, [address] [address] Phone:  [phone]  Fax: [phone] 
======== Chief Complaint Patient presents with [address] [place] [address] SUBJECTIVE: 
 
Bebo Mariano is a 80 y.o. female Patient returns today after a long absence. She was last admitted to the hospital by Dr. Althea Isbell on 12/15 and discharged on 12/19/14 with a right basal ganglionic hemorrhagic infarct. Was also noted to have hypertension. She next was seen by Quinn Massey on 03/31 complaining of dizziness and generalized weakness and metabolic evaluation was unremarkable. Patient's daughter, Mary Byrne, whose phone number is [phone], with whom she is staying currently, has several concerns. She has dizzy spells, which she describes as an episode where she becomes unresponsive and would actually collapse to the floor if she were not caught. She is unresponsive for about five minutes. She is not able to talk or communicate during those five minutes. However there is no report of loss of bowel or urine during the episodes. She has some shaking and wobbliness during the episodes. She has had three episodes over the past month. It takes up to 12 minutes for her mental status to return after the five minute unresponsive spell. She was living in [place] with her daughter who comes with her today. She fractured her ankle about three weeks ago and has a splint on it currently. She has not had orthopedic follow up since that time, however. There is also a blister on her buttocks which she is concerned about because of the long ride from [place] to [facility]. Patient also complains of right hip discomfort and is seen for evaluation. Current Outpatient Medications Medication Sig Dispense Refill  aspirin 81 mg chewable tablet Take 81 mg by mouth daily.  labetalol (NORMODYNE) 200 mg tablet Take 200 mg by mouth two (2) times a day.  donepezil (ARICEPT) 10 mg tablet Take 10 mg by mouth nightly.  gabapentin (NEURONTIN) 300 mg capsule Take 300 mg by mouth two (2) times a day.  losartan (COZAAR) 100 mg tablet Take 100 mg by mouth daily.  omeprazole (PRILOSEC) 20 mg capsule Take 20 mg by mouth daily.  polyethylene glycol (MIRALAX) 17 gram packet Take 17 g by mouth daily as needed.  methyl salicylate/menthol (CARSON BYERS EX) by Apply Externally route three (3) times daily as needed for Pain.  montelukast (SINGULAIR) 10 mg tablet Take 10 mg by mouth daily.  multivitamins-minerals-lutein (CENTRUM SILVER) Tab Take 1 Tab by mouth. Past Medical History:  
Diagnosis Date  Ankle fracture 01/03/2019  
 [facility]  
 Hip pain, right 02/04/2019  Hypertension  Infarction of right basal ganglia ([facility] [address] 12/15/2014 Right basal ganglionic hemorrhagic infarct  Unresponsive episode History reviewed. No pertinent surgical history. Allergies Allergen Reactions  Pcn [Penicillins] Unknown (comments) REVIEW OF SYSTEMS: 
General: negative for - chills or fever ENT: negative for - headaches, nasal congestion or tinnitus Respiratory: negative for - cough, hemoptysis, shortness of breath or wheezing Cardiovascular : negative for - chest pain, edema, palpitations or shortness of breath Gastrointestinal: negative for - abdominal pain, blood in stools, heartburn or nausea/vomiting Genito-Urinary: no dysuria, trouble voiding, or hematuria Musculoskeletal: negative for - gait disturbance, joint pain, joint stiffness or joint swelling Neurological: no TIA or stroke symptoms Hematologic: no bruises, no bleeding, no swollen glands Integument: no lumps, mole changes, nail changes or rash Endocrine:no malaise/lethargy or unexpected weight changes Social History Socioeconomic History  Marital status: UNKNOWN Spouse name: Not on file  Number of children: Not on file  Years of education: Not on file  Highest education level: Not on file Tobacco Use  Smoking status: Never Smoker  Smokeless tobacco: Never Used Substance and Sexual Activity  Alcohol use: No  
 Drug use: No  
 Sexual activity: Not Currently Social History Narrative Medical History: [id number] Gyn History: Last mammogram date 2013. Surgical History: R hip hemiarthroplasty 2008 Hospitalization/Major Diagnostic Procedure: Denies Past Hospitalization Family History: Mother: deceasedFather: deceasedSister(s): , ca pancreasBrother(s): aliveDaughter(s): aliveSon(s): alive1  
 brother(s) . 1 son(s) , 1 daughter(s) . Social History: Alcohol Use Patient does not use alcohol. Smoking Status Patient is a never smoker. Marital Status: W idow . Lives w ith:  
 alone. Occupation/W ork: beautician. Mu-ism: pilgram [employer].  
 
History reviewed. No pertinent family history. OBJECTIVE:  
 
Visit Vitals BP (!) 89/49 Pulse 70 Temp 97.8 °F (36.6 °C) (Oral) Resp 18 Ht 5' 2\" (1.575 m) Wt 99 lb 14.4 oz (45.3 kg) SpO2 98% BMI 18.27 kg/m² CONSTITUTIONAL: well , well nourished, appears age appropriate EYES: perrla, eom intact ENMT:moist mucous membranes, pharynx clear NECK: supple. Thyroid normal 
RESPIRATORY: Chest: clear bilaterally CARDIOVASCULAR: Heart: regular rate and rhythm GASTROINTESTINAL: Abdomen: soft, bowel sounds active HEMATOLOGIC: no pathological lymph nodes palpated MUSCULOSKELETAL: Extremities: no edema, pulse 1+ INTEGUMENT: No unusual rashes or suspicious skin lesions noted. Nails appear normal. 
NEUROLOGIC: non-focal exam  
MENTAL STATUS: alert and oriented, appropriate affect Admission on 2019, Discharged on 2019 Component Date Value Ref Range Status  Ventricular Rate 2019 52  BPM Final  
 Atrial Rate 2019 52  BPM Final  
 P-R Interval 2019 112  ms Final  
 QRS Duration 2019 70  ms Final  
  Q-T Interval 01/31/2019 484  ms Final  
 QTC Calculation (Bezet) 01/31/2019 450  ms Final  
 Calculated P Axis 01/31/2019 64  degrees Final  
 Calculated R Axis 01/31/2019 46  degrees Final  
 Calculated T Axis 01/31/2019 60  degrees Final  
 Diagnosis 01/31/2019    Final  
                 Value:Sinus bradycardia No previous ECGs available Confirmed by Aman Xavier MD, Sage Son ([id number]) on 1/31/2019 4:17:18 PM 
  
 WBC 01/31/2019 8.2  3.6 - 11.0 K/uL Final  
 Comment: Due to mathematical rounding between the [address], and the new [employer] Hematology analyzers, the reported automated differential may vary by up to +/- 0.5% per cell line. This finding may produce a result that is 100% +/- 3%, which is clinically insignificant.  RBC 01/31/2019 3.17* 3.80 - 5.20 M/uL Final  
 HGB 01/31/2019 9.5* 11.5 - 16.0 g/dL Final  
 HCT 01/31/2019 30.4* 35.0 - 47.0 % Final  
 MCV 01/31/2019 95.9  80.0 - 99.0 FL Final  
 MCH 01/31/2019 30.0  26.0 - 34.0 PG Final  
 MCHC 01/31/2019 31.3  30.0 - 36.5 g/dL Final  
 RDW 01/31/2019 13.6  11.5 - 14.5 % Final  
 PLATELET 69/25/4725 680  150 - 400 K/uL Final  
 MPV 01/31/2019 9.6  8.9 - 12.9 FL Final  
 NRBC 01/31/2019 0.0  0  WBC Final  
 ABSOLUTE NRBC 01/31/2019 0.00  0.00 - 0.01 K/uL Final  
 NEUTROPHILS 01/31/2019 67  32 - 75 % Final  
 LYMPHOCYTES 01/31/2019 24  12 - 49 % Final  
 MONOCYTES 01/31/2019 6  5 - 13 % Final  
 EOSINOPHILS 01/31/2019 2  0 - 7 % Final  
 BASOPHILS 01/31/2019 1  0 - 1 % Final  
 IMMATURE GRANULOCYTES 01/31/2019 0  0.0 - 0.5 % Final  
 ABS. NEUTROPHILS 01/31/2019 5.6  1.8 - 8.0 K/UL Final  
 ABS. LYMPHOCYTES 01/31/2019 2.0  0.8 - 3.5 K/UL Final  
 ABS. MONOCYTES 01/31/2019 0.5  0.0 - 1.0 K/UL Final  
 ABS. EOSINOPHILS 01/31/2019 0.2  0.0 - 0.4 K/UL Final  
 ABS. BASOPHILS 01/31/2019 0.1  0.0 - 0.1 K/UL Final  
 ABS. IMM.  GRANS. 01/31/2019 0.0  0.00 - 0.04 K/UL Final  
  DF 01/31/2019 AUTOMATED    Final  
 Sodium 01/31/2019 145  136 - 145 mmol/L Final  
 Potassium 01/31/2019 4.4  3.5 - 5.1 mmol/L Final  
 Chloride 01/31/2019 114* 97 - 108 mmol/L Final  
 CO2 01/31/2019 21  21 - 32 mmol/L Final  
 Anion gap 01/31/2019 10  5 - 15 mmol/L Final  
 Glucose 01/31/2019 111* 65 - 100 mg/dL Final  
 BUN 01/31/2019 26* 6 - 20 MG/DL Final  
 Creatinine 01/31/2019 1.34* 0.55 - 1.02 MG/DL Final  
 BUN/Creatinine ratio 01/31/2019 19  12 - 20   Final  
 GFR est AA 01/31/2019 45* >60 ml/min/1.73m2 Final  
 GFR est non-AA 01/31/2019 37* >60 ml/min/1.73m2 Final  
 Comment: Estimated GFR is calculated using the IDMS-traceable Modification of Diet in Renal Disease (MDRD) Study equation, reported for both  Americans (GFRAA) and non- Americans (GFRNA), and normalized to 1.73m2 body surface area. The physician must decide which value applies to the patient. The MDRD study equation should only be used in individuals age 25 or older. It has not been validated for the following: pregnant women, patients with serious comorbid conditions, or on certain medications, or persons with extremes of body size, muscle mass, or nutritional status.  Calcium 01/31/2019 9.0  8.5 - 10.1 MG/DL Final  
 Bilirubin, total 01/31/2019 0.4  0.2 - 1.0 MG/DL Final  
 ALT (SGPT) 01/31/2019 16  12 - 78 U/L Final  
 AST (SGOT) 01/31/2019 14* 15 - 37 U/L Final  
 Alk. phosphatase 01/31/2019 78  45 - 117 U/L Final  
 Protein, total 01/31/2019 7.4  6.4 - 8.2 g/dL Final  
 Albumin 01/31/2019 3.4* 3.5 - 5.0 g/dL Final  
 Globulin 01/31/2019 4.0  2.0 - 4.0 g/dL Final  
 A-G Ratio 01/31/2019 0.9* 1.1 - 2.2   Final  
 SAMPLES BEING HELD 01/31/2019 1RED 1BLU   Final  
 COMMENT 01/31/2019 Add-on orders for these samples will be processed based on acceptable specimen integrity and analyte stability, which may vary by analyte.     Final  
 Troponin-I, Qt. 01/31/2019 <0.05  <0.05 ng/mL Final  
 Comment: The presence of detectable troponin above the reference range indicates myocardial injury which may be due to ischemia, myocarditis, trauma, etc. 
Clinical correlation is necessary to establish the significance of this finding. Sequential testing is recommended to determine if the typical rise and fall of cTnI is demonstrated. Note:  Cardiac troponin I has a relatively long half life and may be present well after the CK MB has returned to baseline. The reference range is based on the 99th percentile of the referent population. ASSESSMENT:  
1. Essential hypertension 2. Gastroesophageal reflux disease without esophagitis 3. Hip pain, right 4. Unresponsive episode 5. Closed fracture of right ankle with routine healing, subsequent encounter 6. Attention and concentration deficit following unspecified cerebrovascular disease The spells she describes are very concerning. Whether they are TIAs or form of seizures is unclear and they need to be investigated. Will ask for CT scan of head with contrast if kidneys allow, as well as EEG,  and ask for neurological opinion about the spells. As I recall, she was on Keppra at one time back in 2014, presumably for seizures. They may now be resurfacing and that is the reason for the investigation. The right hip discomfort is most likely related to the wear and tear of this right hemiarthroplasty that was performed in 2018. When she sees the orthopedic doctor about her foot will ask him to also address the right hip discomfort. For her mental status we could add Namenda to try and continue to slow down her decline in cognitive function, but will do that at a later time after we sort out these spells she is having. Daughter seems to be in agreement with us. She is going to be staying with her son, however, as the daughter presumably is going back home.   She will be back to see us within about two to four weeks, sooner if she has further spells. I have discussed the diagnosis with the patient and the intended plan as seen in the 
orders above. The patient understands and agees with the plan. The patient has  
received an after visit summary and questions were answered concerning 
future plans Patient labs and/or xrays were reviewed Past records were reviewed. PLAN: 
. Orders Placed This Encounter [address] [place] [address] Neurology [facility]  REFERRAL TO ORTHOPEDICS Follow-up Disposition: 
Return in about 4 weeks (around 3/4/2019). ATTENTION:  
This medical record was transcribed using an electronic medical records system. Although proofread, it may and can contain electronic and spelling errors. Other human spelling and other errors may be present. Corrections may be executed at a later time. Please feel free to contact us for any clarifications as needed.

## 2019-02-04 NOTE — PROGRESS NOTES
NNTOCED [facility] 1/31/2019: Dizziness/Fall/Syncope f/u Consult done w/ PSR--medication managed. Goals Addressed This Visit's Progress  Attends follow up appointments on schedule   On track 2/1/2019:  Patient scheduled for JOANNA visit 2/4/2019. EW  
 
2/4/2019:  NN met with patient & caretaker daughter SHEYLA Parra. Patient unable to walk; heel boot/wheelchair transport. EW  
  
  Coordinate pain management plan for patient. On track 2/4/2019:  Patient unable to bear weight on foot/ankle; referred to Ortho. Patient will attend Referral date for Ortho. EW  
  
  
  
Goal completion:  Pending.

## 2019-02-05 ENCOUNTER — HOME HEALTH ADMISSION (OUTPATIENT)
Dept: HOME HEALTH SERVICES | Facility: HOME HEALTH | Age: 84
End: 2019-02-05
Payer: MEDICARE

## 2019-02-11 ENCOUNTER — HOME CARE VISIT (OUTPATIENT)
Dept: SCHEDULING | Facility: HOME HEALTH | Age: 84
End: 2019-02-11
Payer: MEDICARE

## 2019-02-11 VITALS
TEMPERATURE: 98.3 F | SYSTOLIC BLOOD PRESSURE: 128 MMHG | HEART RATE: 53 BPM | DIASTOLIC BLOOD PRESSURE: 70 MMHG | OXYGEN SATURATION: 99 % | RESPIRATION RATE: 20 BRPM

## 2019-02-11 PROCEDURE — 400013 HH SOC

## 2019-02-11 PROCEDURE — 3331090002 HH PPS REVENUE DEBIT

## 2019-02-11 PROCEDURE — G0299 HHS/HOSPICE OF RN EA 15 MIN: HCPCS

## 2019-02-11 PROCEDURE — 3331090001 HH PPS REVENUE CREDIT

## 2019-02-12 PROCEDURE — 3331090001 HH PPS REVENUE CREDIT

## 2019-02-12 PROCEDURE — 3331090002 HH PPS REVENUE DEBIT

## 2019-02-13 ENCOUNTER — HOME CARE VISIT (OUTPATIENT)
Dept: SCHEDULING | Facility: HOME HEALTH | Age: 84
End: 2019-02-13
Payer: MEDICARE

## 2019-02-13 VITALS
OXYGEN SATURATION: 98 % | RESPIRATION RATE: 18 BRPM | DIASTOLIC BLOOD PRESSURE: 72 MMHG | TEMPERATURE: 97.8 F | HEART RATE: 55 BPM | SYSTOLIC BLOOD PRESSURE: 120 MMHG

## 2019-02-13 PROCEDURE — G0151 HHCP-SERV OF PT,EA 15 MIN: HCPCS

## 2019-02-13 PROCEDURE — 3331090001 HH PPS REVENUE CREDIT

## 2019-02-13 PROCEDURE — 3331090002 HH PPS REVENUE DEBIT

## 2019-02-14 ENCOUNTER — HOME CARE VISIT (OUTPATIENT)
Dept: SCHEDULING | Facility: HOME HEALTH | Age: 84
End: 2019-02-14
Payer: MEDICARE

## 2019-02-14 PROCEDURE — 3331090002 HH PPS REVENUE DEBIT

## 2019-02-14 PROCEDURE — 3331090001 HH PPS REVENUE CREDIT

## 2019-02-14 PROCEDURE — G0300 HHS/HOSPICE OF LPN EA 15 MIN: HCPCS

## 2019-02-15 ENCOUNTER — HOME CARE VISIT (OUTPATIENT)
Dept: SCHEDULING | Facility: HOME HEALTH | Age: 84
End: 2019-02-15
Payer: MEDICARE

## 2019-02-15 PROCEDURE — 3331090001 HH PPS REVENUE CREDIT

## 2019-02-15 PROCEDURE — 3331090002 HH PPS REVENUE DEBIT

## 2019-02-15 PROCEDURE — G0155 HHCP-SVS OF CSW,EA 15 MIN: HCPCS

## 2019-02-15 RX ORDER — DONEPEZIL HYDROCHLORIDE 10 MG/1
10 TABLET, FILM COATED ORAL
Qty: 30 TAB | Refills: 11 | Status: SHIPPED | OUTPATIENT
Start: 2019-02-15 | End: 2019-03-04 | Stop reason: SDUPTHER

## 2019-02-16 PROCEDURE — 3331090002 HH PPS REVENUE DEBIT

## 2019-02-16 PROCEDURE — 3331090001 HH PPS REVENUE CREDIT

## 2019-02-17 PROCEDURE — 3331090002 HH PPS REVENUE DEBIT

## 2019-02-17 PROCEDURE — 3331090001 HH PPS REVENUE CREDIT

## 2019-02-18 ENCOUNTER — HOME CARE VISIT (OUTPATIENT)
Dept: SCHEDULING | Facility: HOME HEALTH | Age: 84
End: 2019-02-18
Payer: MEDICARE

## 2019-02-18 VITALS — SYSTOLIC BLOOD PRESSURE: 156 MMHG | HEART RATE: 63 BPM | DIASTOLIC BLOOD PRESSURE: 73 MMHG | TEMPERATURE: 97.6 F

## 2019-02-18 VITALS
DIASTOLIC BLOOD PRESSURE: 62 MMHG | OXYGEN SATURATION: 99 % | SYSTOLIC BLOOD PRESSURE: 110 MMHG | RESPIRATION RATE: 18 BRPM | TEMPERATURE: 97.8 F | HEART RATE: 72 BPM

## 2019-02-18 PROCEDURE — 3331090001 HH PPS REVENUE CREDIT

## 2019-02-18 PROCEDURE — G0151 HHCP-SERV OF PT,EA 15 MIN: HCPCS

## 2019-02-18 PROCEDURE — 3331090002 HH PPS REVENUE DEBIT

## 2019-02-19 ENCOUNTER — HOME CARE VISIT (OUTPATIENT)
Dept: SCHEDULING | Facility: HOME HEALTH | Age: 84
End: 2019-02-19
Payer: MEDICARE

## 2019-02-19 PROCEDURE — G0300 HHS/HOSPICE OF LPN EA 15 MIN: HCPCS

## 2019-02-19 PROCEDURE — 3331090001 HH PPS REVENUE CREDIT

## 2019-02-19 PROCEDURE — 3331090002 HH PPS REVENUE DEBIT

## 2019-02-20 ENCOUNTER — HOME CARE VISIT (OUTPATIENT)
Dept: HOME HEALTH SERVICES | Facility: HOME HEALTH | Age: 84
End: 2019-02-20
Payer: MEDICARE

## 2019-02-20 VITALS
SYSTOLIC BLOOD PRESSURE: 110 MMHG | DIASTOLIC BLOOD PRESSURE: 68 MMHG | HEART RATE: 62 BPM | OXYGEN SATURATION: 98 % | TEMPERATURE: 96.8 F

## 2019-02-20 PROCEDURE — 3331090001 HH PPS REVENUE CREDIT

## 2019-02-20 PROCEDURE — 3331090002 HH PPS REVENUE DEBIT

## 2019-02-21 ENCOUNTER — HOME CARE VISIT (OUTPATIENT)
Dept: SCHEDULING | Facility: HOME HEALTH | Age: 84
End: 2019-02-21
Payer: MEDICARE

## 2019-02-21 VITALS — SYSTOLIC BLOOD PRESSURE: 175 MMHG | TEMPERATURE: 97.6 F | DIASTOLIC BLOOD PRESSURE: 71 MMHG | HEART RATE: 56 BPM

## 2019-02-21 PROCEDURE — G0300 HHS/HOSPICE OF LPN EA 15 MIN: HCPCS

## 2019-02-21 PROCEDURE — 3331090001 HH PPS REVENUE CREDIT

## 2019-02-21 PROCEDURE — 3331090002 HH PPS REVENUE DEBIT

## 2019-02-22 PROCEDURE — 3331090002 HH PPS REVENUE DEBIT

## 2019-02-22 PROCEDURE — 3331090001 HH PPS REVENUE CREDIT

## 2019-02-23 PROCEDURE — 3331090001 HH PPS REVENUE CREDIT

## 2019-02-23 PROCEDURE — 3331090002 HH PPS REVENUE DEBIT

## 2019-02-24 PROCEDURE — 3331090001 HH PPS REVENUE CREDIT

## 2019-02-24 PROCEDURE — 3331090002 HH PPS REVENUE DEBIT

## 2019-02-25 ENCOUNTER — HOME CARE VISIT (OUTPATIENT)
Dept: SCHEDULING | Facility: HOME HEALTH | Age: 84
End: 2019-02-25
Payer: MEDICARE

## 2019-02-25 VITALS
SYSTOLIC BLOOD PRESSURE: 130 MMHG | HEART RATE: 64 BPM | OXYGEN SATURATION: 100 % | TEMPERATURE: 97.8 F | DIASTOLIC BLOOD PRESSURE: 64 MMHG | RESPIRATION RATE: 16 BRPM

## 2019-02-25 PROCEDURE — G0151 HHCP-SERV OF PT,EA 15 MIN: HCPCS

## 2019-02-25 PROCEDURE — 3331090001 HH PPS REVENUE CREDIT

## 2019-02-25 PROCEDURE — 3331090002 HH PPS REVENUE DEBIT

## 2019-02-26 PROCEDURE — 3331090001 HH PPS REVENUE CREDIT

## 2019-02-26 PROCEDURE — 3331090002 HH PPS REVENUE DEBIT

## 2019-02-27 PROCEDURE — 3331090002 HH PPS REVENUE DEBIT

## 2019-02-27 PROCEDURE — 3331090001 HH PPS REVENUE CREDIT

## 2019-02-28 ENCOUNTER — HOME CARE VISIT (OUTPATIENT)
Dept: SCHEDULING | Facility: HOME HEALTH | Age: 84
End: 2019-02-28
Payer: MEDICARE

## 2019-02-28 VITALS
RESPIRATION RATE: 20 BRPM | HEART RATE: 60 BPM | SYSTOLIC BLOOD PRESSURE: 142 MMHG | DIASTOLIC BLOOD PRESSURE: 70 MMHG | OXYGEN SATURATION: 100 % | TEMPERATURE: 96.8 F

## 2019-02-28 PROCEDURE — 3331090001 HH PPS REVENUE CREDIT

## 2019-02-28 PROCEDURE — 3331090002 HH PPS REVENUE DEBIT

## 2019-02-28 PROCEDURE — G0299 HHS/HOSPICE OF RN EA 15 MIN: HCPCS

## 2019-03-01 PROCEDURE — 3331090001 HH PPS REVENUE CREDIT

## 2019-03-01 PROCEDURE — 3331090002 HH PPS REVENUE DEBIT

## 2019-03-02 PROCEDURE — 3331090001 HH PPS REVENUE CREDIT

## 2019-03-02 PROCEDURE — 3331090002 HH PPS REVENUE DEBIT

## 2019-03-03 PROCEDURE — 3331090002 HH PPS REVENUE DEBIT

## 2019-03-03 PROCEDURE — 3331090001 HH PPS REVENUE CREDIT

## 2019-03-04 ENCOUNTER — OFFICE VISIT (OUTPATIENT)
Dept: INTERNAL MEDICINE CLINIC | Age: 84
End: 2019-03-04

## 2019-03-04 VITALS
HEART RATE: 68 BPM | RESPIRATION RATE: 18 BRPM | DIASTOLIC BLOOD PRESSURE: 66 MMHG | OXYGEN SATURATION: 98 % | HEIGHT: 62 IN | BODY MASS INDEX: 17.76 KG/M2 | SYSTOLIC BLOOD PRESSURE: 182 MMHG | TEMPERATURE: 97.8 F | WEIGHT: 96.5 LBS

## 2019-03-04 DIAGNOSIS — Z13.31 SCREENING FOR DEPRESSION: ICD-10-CM

## 2019-03-04 DIAGNOSIS — Z13.39 SCREENING FOR ALCOHOLISM: ICD-10-CM

## 2019-03-04 DIAGNOSIS — Z00.00 MEDICARE ANNUAL WELLNESS VISIT, SUBSEQUENT: Primary | ICD-10-CM

## 2019-03-04 DIAGNOSIS — I63.9 INFARCTION OF RIGHT BASAL GANGLIA (HCC): ICD-10-CM

## 2019-03-04 DIAGNOSIS — I10 ESSENTIAL HYPERTENSION: ICD-10-CM

## 2019-03-04 DIAGNOSIS — I61.9 NONTRAUMATIC INTRACEREBRAL HEMORRHAGE, UNSPECIFIED CEREBRAL LOCATION, UNSPECIFIED LATERALITY (HCC): ICD-10-CM

## 2019-03-04 DIAGNOSIS — K21.9 GASTROESOPHAGEAL REFLUX DISEASE WITHOUT ESOPHAGITIS: ICD-10-CM

## 2019-03-04 PROCEDURE — 3331090002 HH PPS REVENUE DEBIT

## 2019-03-04 PROCEDURE — 3331090001 HH PPS REVENUE CREDIT

## 2019-03-04 RX ORDER — DONEPEZIL HYDROCHLORIDE 10 MG/1
10 TABLET, FILM COATED ORAL
Qty: 90 TAB | Refills: 11 | Status: SHIPPED | OUTPATIENT
Start: 2019-03-04

## 2019-03-04 RX ORDER — LABETALOL 300 MG/1
300 TABLET, FILM COATED ORAL 2 TIMES DAILY
Qty: 180 TAB | Refills: 3 | Status: SHIPPED | OUTPATIENT
Start: 2019-03-04

## 2019-03-04 NOTE — PROGRESS NOTES
SPORTS MEDICINE AND PRIMARY CARE Gray Aguirre MD, [address] [address] Phone:  [phone]  Fax: [phone] Chief Complaint Patient presents with [facility] Annual Wellness Visit SUBECTIVE: 
 
Brionna Davila is a 80 y.o. female Patient returns today with history of primary hypertension, GERD, right basilar ganglia infarct, intracerebral hemorrhage and is seen for evaluation. We saw her last week after a long absence. She was living with her daughter in [place] and fractured her ankle about three weeks prior. She was having some spells and we asked for a neurological evaluation and assistance, but she has not seen neurology as of yet. A CT scan was also requested, but that has not been accomplished yet either. Patient is seen for evaluation. The CT and EEG have to be rescheduled because of the inclement weather the day it was previously scheduled. Then when they went to reschedule it they did not advise him he needed a $250 fee before they would do the EEG, therefore she has not had the CT, EEG or appointment with neurologist.  He mentions today he would prefer to have a neurologist at [facility] due to proximity of his home. Patient is seen for evaluation. Patient states she is feeling better. Current Outpatient Medications Medication Sig Dispense Refill  labetalol (NORMODYNE) 300 mg tablet Take 1 Tab by mouth two (2) times a day. 180 Tab 3  
 donepezil (ARICEPT) 10 mg tablet Take 1 Tab by mouth nightly. 90 Tab 11  
 aspirin 81 mg chewable tablet Take 81 mg by mouth daily.  gabapentin (NEURONTIN) 300 mg capsule Take 300 mg by mouth two (2) times a day.  losartan (COZAAR) 100 mg tablet Take 100 mg by mouth daily.  omeprazole (PRILOSEC) 20 mg capsule Take 20 mg by mouth daily.  polyethylene glycol (MIRALAX) 17 gram packet Take 17 g by mouth daily as needed for Other (constipation).  methyl salicylate/menthol (CARSON BYERS EX) by Apply Externally route three (3) times daily as needed for Pain.  montelukast (SINGULAIR) 10 mg tablet Take 10 mg by mouth daily.  multivitamins-minerals-lutein (CENTRUM SILVER) Tab Take 1 Tab by mouth daily. Past Medical History:  
Diagnosis Date  Ankle fracture 2019  
 [facility]  
 Hip pain, right 2019  Hypertension  Infarction of right basal ganglia ([address] 12/15/2014 Right basal ganglionic hemorrhagic infarct  Unresponsive episode History reviewed. No pertinent surgical history. Allergies Allergen Reactions  Pcn [Penicillins] Unknown (comments) REVIEW OF SYSTEMS: 
 History is less reliable due to mental status. Social History Socioeconomic History  Marital status: SINGLE Spouse name: Not on file  Number of children: Not on file  Years of education: Not on file  Highest education level: Not on file Tobacco Use  Smoking status: Never Smoker  Smokeless tobacco: Never Used Substance and Sexual Activity  Alcohol use: No  
 Drug use: No  
 Sexual activity: Not Currently Social History Narrative Medical History: [id number] Gyn History: Last mammogram date 2013. Surgical History: R hip hemiarthroplasty 2008 Hospitalization/Major Diagnostic Procedure: Denies Past Hospitalization Family History: Mother: deceasedFather: deceasedSister(s): , ca pancreasBrother(s): aliveDaughter(s): aliveSon(s): alive1  
 brother(s) . 1 son(s) , 1 daughter(s) . Social History: Alcohol Use Patient does not use alcohol. Smoking Status Patient is a never smoker. Marital Status: W idow . Lives w ith:  
 alone. Occupation/W ork: beautician. Yazidism: pilgram Gnosticist.  
r 
History reviewed. No pertinent family history. OBJECTIVE: 
Visit Vitals /66 Pulse 68 Temp 97.8 °F (36.6 °C) (Oral) Resp 18 Ht 5' 2\" (1.575 m) Wt 96 lb 8 oz (43.8 kg) SpO2 98% BMI 17.65 kg/m² ENT: perrla,  eom intact NECK: supple. Thyroid normal 
CHEST: clear to ascultation and percussion HEART: regular rate and rhythm ABD: soft, bowel sounds active EXTREMITIES: no edema, pulse 1+ Admission on 01/31/2019, Discharged on 01/31/2019 Component Date Value Ref Range Status  Ventricular Rate 01/31/2019 52  BPM Final  
 Atrial Rate 01/31/2019 52  BPM Final  
 P-R Interval 01/31/2019 112  ms Final  
 QRS Duration 01/31/2019 70  ms Final  
 Q-T Interval 01/31/2019 484  ms Final  
 QTC Calculation (Bezet) 01/31/2019 450  ms Final  
 Calculated P Axis 01/31/2019 64  degrees Final  
 Calculated R Axis 01/31/2019 46  degrees Final  
 Calculated T Axis 01/31/2019 60  degrees Final  
 Diagnosis 01/31/2019    Final  
                 Value:Sinus bradycardia No previous ECGs available Confirmed by Paulino Koch MD, Karen Sheikh ([id number]) on 1/31/2019 4:17:18 PM 
  
 WBC 01/31/2019 8.2  3.6 - 11.0 K/uL Final  
 Comment: Due to mathematical rounding between the [address], and the new [employer] Hemat[employer] analyzers, the reported automated differential may vary by up to +/- 0.5% per cell line. This finding may produce a result that is 100% +/- 3%, which is clinically insignificant.  
  
 RBC 01/31/2019 3.17* 3.80 - 5.20 M/uL Final  
 HGB 01/31/2019 9.5* 11.5 - 16.0 g/dL Final  
 HCT 01/31/2019 30.4* 35.0 - 47.0 % Final  
 MCV 01/31/2019 95.9  80.0 - 99.0 FL Final  
 MCH 01/31/2019 30.0  26.0 - 34.0 PG Final  
 MCHC 01/31/2019 31.3  30.0 - 36.5 g/dL Final  
 RDW 01/31/2019 13.6  11.5 - 14.5 % Final  
 PLATELET 70/78/1376 724  150 - 400 K/uL Final  
 MPV 01/31/2019 9.6  8.9 - 12.9 FL Final  
 NRBC 01/31/2019 0.0  0  WBC Final  
 ABSOLUTE NRBC 01/31/2019 0.00  0.00 - 0.01 K/uL Final  
 NEUTROPHILS 01/31/2019 67  32 - 75 % Final  
 LYMPHOCYTES 01/31/2019 24  12 - 49 % Final  
 MONOCYTES 01/31/2019 6  5 - 13 % Final  
  EOSINOPHILS 01/31/2019 2  0 - 7 % Final  
 BASOPHILS 01/31/2019 1  0 - 1 % Final  
 IMMATURE GRANULOCYTES 01/31/2019 0  0.0 - 0.5 % Final  
 ABS. NEUTROPHILS 01/31/2019 5.6  1.8 - 8.0 K/UL Final  
 ABS. LYMPHOCYTES 01/31/2019 2.0  0.8 - 3.5 K/UL Final  
 ABS. MONOCYTES 01/31/2019 0.5  0.0 - 1.0 K/UL Final  
 ABS. EOSINOPHILS 01/31/2019 0.2  0.0 - 0.4 K/UL Final  
 ABS. BASOPHILS 01/31/2019 0.1  0.0 - 0.1 K/UL Final  
 ABS. IMM. GRANS. 01/31/2019 0.0  0.00 - 0.04 K/UL Final  
 DF 01/31/2019 AUTOMATED    Final  
 Sodium 01/31/2019 145  136 - 145 mmol/L Final  
 Potassium 01/31/2019 4.4  3.5 - 5.1 mmol/L Final  
 Chloride 01/31/2019 114* 97 - 108 mmol/L Final  
 CO2 01/31/2019 21  21 - 32 mmol/L Final  
 Anion gap 01/31/2019 10  5 - 15 mmol/L Final  
 Glucose 01/31/2019 111* 65 - 100 mg/dL Final  
 BUN 01/31/2019 26* 6 - 20 MG/DL Final  
 Creatinine 01/31/2019 1.34* 0.55 - 1.02 MG/DL Final  
 BUN/Creatinine ratio 01/31/2019 19  12 - 20   Final  
 GFR est AA 01/31/2019 45* >60 ml/min/1.73m2 Final  
 GFR est non-AA 01/31/2019 37* >60 ml/min/1.73m2 Final  
 Comment: Estimated GFR is calculated using the IDMS-traceable Modification of Diet in Renal Disease (MDRD) Study equation, reported for both  Americans (GFRAA) and non- Americans (GFRNA), and normalized to 1.73m2 body surface area. The physician must decide which value applies to the patient. The MDRD study equation should only be used in individuals age 25 or older. It has not been validated for the following: pregnant women, patients with serious comorbid conditions, or on certain medications, or persons with extremes of body size, muscle mass, or nutritional status.  Calcium 01/31/2019 9.0  8.5 - 10.1 MG/DL Final  
 Bilirubin, total 01/31/2019 0.4  0.2 - 1.0 MG/DL Final  
 ALT (SGPT) 01/31/2019 16  12 - 78 U/L Final  
 AST (SGOT) 01/31/2019 14* 15 - 37 U/L Final  
 Alk.  phosphatase 01/31/2019 78  45 - 117 U/L Final  
  Protein, total 01/31/2019 7.4  6.4 - 8.2 g/dL Final  
 Albumin 01/31/2019 3.4* 3.5 - 5.0 g/dL Final  
 Globulin 01/31/2019 4.0  2.0 - 4.0 g/dL Final  
 A-G Ratio 01/31/2019 0.9* 1.1 - 2.2   Final  
 SAMPLES BEING HELD 01/31/2019 1RED 1BLU   Final  
 COMMENT 01/31/2019 Add-on orders for these samples will be processed based on acceptable specimen integrity and analyte stability, which may vary by analyte. Final  
 Troponin-I, Qt. 01/31/2019 <0.05  <0.05 ng/mL Final  
 Comment: The presence of detectable troponin above the reference range indicates myocardial injury which may be due to ischemia, myocarditis, trauma, etc. 
Clinical correlation is necessary to establish the significance of this finding. Sequential testing is recommended to determine if the typical rise and fall of cTnI is demonstrated. Note:  Cardiac troponin I has a relatively long half life and may be present well after the CK MB has returned to baseline. The reference range is based on the 99th percentile of the referent population. ASSESSMENT: 
1. Medicare annual wellness visit, subsequent 2. Screening for alcoholism 3. Screening for depression 4. Essential hypertension 5. Gastroesophageal reflux disease without esophagitis 6. Infarction of right basal ganglia (HCC) 7. Nontraumatic intracerebral hemorrhage, unspecified cerebral location, unspecified laterality ([facility] [address] Due to the proximity of [address] [address] [facility] we will schedule neurology consult, CT and EEG to be completed there. We will alert our referral clerk, who will alert _____. Her blood pressure is much higher than we would like to see it today. Patient's son states that her blood pressure has been running in the 160s, perhaps a little higher sometimes. She is currently receiving Labetalol 200 twice a day for her blood pressure and Losartan 100 mg daily and therefore will increase the Labetalol to 300 mg twice a day.   Son will continue to monitor the blood pressure, although we do not suggest he take it twice a day, we suggest he take it maybe once or twice a week at the most or once or twice a month as we are not actively making changes as he takes it. Continue to encourage PO intake. She is taking Aricept 10 mg now without adverse effect. My vote would be to add Namenda and titrate dose pack. She will return to see us in 2-3 months, sooner if needed. I have discussed the diagnosis with the patient and the intended plan as seen in the 
orders above. The patient understands and agees with the plan. The patient has  
received an after visit summary and questions were answered concerning 
future plans Patient labs and/or xrays were reviewed Past records were reviewed. PLAN: 
. Orders Placed This Encounter  Depression Screen Annual  
 CT HEAD WO MARCELA [facility]rales Neurology ref [id number]  labetalol (NORMODYNE) 300 mg tablet  donepezil (ARICEPT) 10 mg tablet Follow-up Disposition: 
Return in about 3 months (around 6/4/2019). ATTENTION:  
This medical record was transcribed using an electronic medical records system. Although proofread, it may and can contain electronic and spelling errors. Other human spelling and other errors may be present. Corrections may be executed at a later time. Please feel free to contact us for any clarifications as needed.

## 2019-03-04 NOTE — PROGRESS NOTES
1. Have you been to the ER, urgent care clinic since your last visit? Hospitalized since your last visit? No 
 
2. Have you seen or consulted any other health care providers outside of the [address] [place] [address] since your last visit? Include any pap smears or colon screening. No  
 
Wants some medication for hot flashes This is the Subsequent Medicare Annual Wellness Exam, performed 12 months or more after the Initial AWV or the last Subsequent AWV I have reviewed the patient's medical history in detail and updated the computerized patient record. History Past Medical History:  
Diagnosis Date  Ankle fracture 01/03/2019  
 [facility]  
 Hip pain, right 02/04/2019  Hypertension  Infarction of right basal ganglia ([facility] [address] 12/15/2014 Right basal ganglionic hemorrhagic infarct  Unresponsive episode History reviewed. No pertinent surgical history. Current Outpatient Medications Medication Sig Dispense Refill  donepezil (ARICEPT) 10 mg tablet Take 1 Tab by mouth nightly. 30 Tab 11  
 aspirin 81 mg chewable tablet Take 81 mg by mouth daily.  labetalol (NORMODYNE) 200 mg tablet Take 200 mg by mouth two (2) times a day.  gabapentin (NEURONTIN) 300 mg capsule Take 300 mg by mouth two (2) times a day.  losartan (COZAAR) 100 mg tablet Take 100 mg by mouth daily.  omeprazole (PRILOSEC) 20 mg capsule Take 20 mg by mouth daily.  polyethylene glycol (MIRALAX) 17 gram packet Take 17 g by mouth daily as needed for Other (constipation).  methyl salicylate/menthol (CARSON BYERS EX) by Apply Externally route three (3) times daily as needed for Pain.  montelukast (SINGULAIR) 10 mg tablet Take 10 mg by mouth daily.  multivitamins-minerals-lutein (CENTRUM SILVER) Tab Take 1 Tab by mouth daily. Allergies Allergen Reactions  Pcn [Penicillins] Unknown (comments) History reviewed. No pertinent family history. Social History Tobacco Use  
  Smoking status: Never Smoker  Smokeless tobacco: Never Used Substance Use Topics  Alcohol use: No  
 
Patient Active Problem List  
Diagnosis Code  Hypertension I10  
 GERD (gastroesophageal reflux disease) K21.9  Rash R21  
 ICH (intracerebral hemorrhage) ([facility]) I61.9  Accelerated hypertension I10  
 Headache due to intracranial disease R51, G93.9  
 Encephalopathy acute G93.40  Hip pain, right M25.551  Infarction of right basal ganglia ([facility]) I63.9  Ankle fracture S82.899A  Spells of decreased attentiveness R68.89  
 Unresponsive episode R41.89 Depression Risk Factor Screening:  
 
3 most recent PHQ Screens 3/4/2019 Little interest or pleasure in doing things Not at all Feeling down, depressed, irritable, or hopeless Not at all Total Score PHQ 2 0 Alcohol Risk Factor Screening: You do not drink alcohol or very rarely. Functional Ability and Level of Safety:  
Hearing Loss Hearing is good. Activities of Daily Living The home contains: handrails Patient does total self care Fall Risk Fall Risk Assessment, last 12 mths 3/4/2019 Able to walk? Yes Fall in past 12 months? No  
Fall with injury? -  
Number of falls in past 12 months - Fall Risk Score -  
 
 
Abuse Screen Patient is not abused Cognitive Screening Evaluation of Cognitive Function: 
Has your family/caregiver stated any concerns about your memory: no 
Normal 
 
Patient Care Team  
Patient Care Team: 
Antonio Avila MD as PCP - General (Internal Medicine) Odalis Hanna, RN as Ambulatory Care Navigator (General Practice) Assessment/Plan Education and counseling provided: 
Are appropriate based on today's review and evaluation Health Maintenance Due Topic Date Due  GLAUCOMA SCREENING Q2Y  09/10/1994  Bone Densitometry (Dexa) Screening  09/10/1994  MEDICARE YEARLY EXAM  03/14/2018 negative...

## 2019-03-04 NOTE — PATIENT INSTRUCTIONS
Medicare Wellness Visit, Female The best way to live healthy is to have a lifestyle where you eat a well-balanced diet, exercise regularly, limit alcohol use, and quit all forms of tobacco/nicotine, if applicable. Regular preventive services are another way to keep healthy. Preventive services (vaccines, screening tests, monitoring & exams) can help personalize your care plan, which helps you manage your own care. Screening tests can find health problems at the earliest stages, when they are easiest to treat. [facility] follows the current, evidence-based guidelines published by the [facility] Jayjay Izabella ([facility]) when recommending preventive services for our patients. Because we follow these guidelines, sometimes recommendations change over time as research supports it. (For example, mammograms used to be recommended annually. Even though Medicare will still pay for an annual mammogram, the newer guidelines recommend a mammogram every two years for women of average risk.) Of course, you and your doctor may decide to screen more often for some diseases, based on your risk and your health status. Preventive services for you include: - Medicare offers their members a free annual wellness visit, which is time for you and your primary care provider to discuss and plan for your preventive service needs. Take advantage of this benefit every year! 
-All adults over the age of 72 should receive the recommended pneumonia vaccines. Current USPSTF guidelines recommend a series of two vaccines for the best pneumonia protection.  
-All adults should have a flu vaccine yearly and a tetanus vaccine every 10 years. All adults age 61 and older should receive a shingles vaccine once in their lifetime.   
-A bone mass density test is recommended when a woman turns 65 to screen for osteoporosis. This test is only recommended one time, as a screening. Some providers will use this same test as a disease monitoring tool if you already have osteoporosis. -All adults age 38-68 who are overweight should have a diabetes screening test once every three years.  
-Other screening tests and preventive services for persons with diabetes include: an eye exam to screen for diabetic retinopathy, a kidney function test, a foot exam, and stricter control over your cholesterol.  
-Cardiovascular screening for adults with routine risk involves an electrocardiogram (ECG) at intervals determined by your doctor.  
-Colorectal cancer screenings should be done for adults age 54-65 with no increased risk factors for colorectal cancer. There are a number of acceptable methods of screening for this type of cancer. Each test has its own benefits and drawbacks. Discuss with your doctor what is most appropriate for you during your annual wellness visit. The different tests include: colonoscopy (considered the best screening method), a fecal occult blood test, a fecal DNA test, and sigmoidoscopy. -Breast cancer screenings are recommended every other year for women of normal risk, age 54-69. 
-Cervical cancer screenings for women over age 72 are only recommended with certain risk factors.  
-All adults born between [facility] should be screened once for Hepatitis C. Here is a list of your current Health Maintenance items (your personalized list of preventive services) with a due date: 
Health Maintenance Due Topic Date Due  Glaucoma Screening   09/10/1994  Bone Mineral Density   09/10/1994 [facility] Annual Well Visit  03/14/2018

## 2019-03-05 PROCEDURE — 3331090001 HH PPS REVENUE CREDIT

## 2019-03-05 PROCEDURE — 3331090002 HH PPS REVENUE DEBIT

## 2019-03-06 PROCEDURE — 3331090002 HH PPS REVENUE DEBIT

## 2019-03-06 PROCEDURE — 3331090001 HH PPS REVENUE CREDIT

## 2019-03-07 PROCEDURE — 3331090001 HH PPS REVENUE CREDIT

## 2019-03-07 PROCEDURE — 3331090002 HH PPS REVENUE DEBIT

## 2019-03-08 ENCOUNTER — HOME CARE VISIT (OUTPATIENT)
Dept: SCHEDULING | Facility: HOME HEALTH | Age: 84
End: 2019-03-08
Payer: MEDICARE

## 2019-03-08 PROCEDURE — 3331090001 HH PPS REVENUE CREDIT

## 2019-03-08 PROCEDURE — G0300 HHS/HOSPICE OF LPN EA 15 MIN: HCPCS

## 2019-03-08 PROCEDURE — 3331090002 HH PPS REVENUE DEBIT

## 2019-03-09 PROCEDURE — 3331090002 HH PPS REVENUE DEBIT

## 2019-03-09 PROCEDURE — 3331090001 HH PPS REVENUE CREDIT

## 2019-03-10 PROCEDURE — 3331090002 HH PPS REVENUE DEBIT

## 2019-03-10 PROCEDURE — 3331090001 HH PPS REVENUE CREDIT

## 2019-03-11 PROCEDURE — 3331090002 HH PPS REVENUE DEBIT

## 2019-03-11 PROCEDURE — 3331090001 HH PPS REVENUE CREDIT

## 2019-03-12 PROCEDURE — 3331090002 HH PPS REVENUE DEBIT

## 2019-03-12 PROCEDURE — 3331090001 HH PPS REVENUE CREDIT

## 2019-03-13 PROCEDURE — 3331090001 HH PPS REVENUE CREDIT

## 2019-03-13 PROCEDURE — 3331090002 HH PPS REVENUE DEBIT

## 2019-03-14 PROCEDURE — 3331090002 HH PPS REVENUE DEBIT

## 2019-03-14 PROCEDURE — 3331090001 HH PPS REVENUE CREDIT

## 2019-03-15 VITALS
TEMPERATURE: 97.7 F | DIASTOLIC BLOOD PRESSURE: 58 MMHG | SYSTOLIC BLOOD PRESSURE: 118 MMHG | HEART RATE: 59 BPM | OXYGEN SATURATION: 98 %

## 2019-03-15 PROCEDURE — 3331090002 HH PPS REVENUE DEBIT

## 2019-03-15 PROCEDURE — 3331090001 HH PPS REVENUE CREDIT

## 2019-03-16 PROCEDURE — 3331090002 HH PPS REVENUE DEBIT

## 2019-03-16 PROCEDURE — 3331090001 HH PPS REVENUE CREDIT

## 2019-03-17 PROCEDURE — 3331090001 HH PPS REVENUE CREDIT

## 2019-03-17 PROCEDURE — 3331090002 HH PPS REVENUE DEBIT

## 2019-03-18 PROCEDURE — 3331090001 HH PPS REVENUE CREDIT

## 2019-03-18 PROCEDURE — 3331090002 HH PPS REVENUE DEBIT

## 2019-03-19 PROCEDURE — 3331090001 HH PPS REVENUE CREDIT

## 2019-03-19 PROCEDURE — 3331090002 HH PPS REVENUE DEBIT

## 2019-03-20 PROCEDURE — 3331090002 HH PPS REVENUE DEBIT

## 2019-03-20 PROCEDURE — 3331090001 HH PPS REVENUE CREDIT

## 2019-03-21 ENCOUNTER — HOME CARE VISIT (OUTPATIENT)
Dept: HOME HEALTH SERVICES | Facility: HOME HEALTH | Age: 84
End: 2019-03-21
Payer: MEDICARE

## 2019-03-21 PROCEDURE — 3331090001 HH PPS REVENUE CREDIT

## 2019-03-21 PROCEDURE — 3331090002 HH PPS REVENUE DEBIT

## 2019-03-22 PROCEDURE — 3331090002 HH PPS REVENUE DEBIT

## 2019-03-22 PROCEDURE — 3331090001 HH PPS REVENUE CREDIT

## 2019-03-23 PROCEDURE — 3331090001 HH PPS REVENUE CREDIT

## 2019-03-23 PROCEDURE — 3331090002 HH PPS REVENUE DEBIT

## 2019-03-24 PROCEDURE — 3331090002 HH PPS REVENUE DEBIT

## 2019-03-24 PROCEDURE — 3331090001 HH PPS REVENUE CREDIT

## 2019-03-25 PROCEDURE — 3331090001 HH PPS REVENUE CREDIT

## 2019-03-25 PROCEDURE — 3331090002 HH PPS REVENUE DEBIT

## 2019-03-26 ENCOUNTER — HOME CARE VISIT (OUTPATIENT)
Dept: SCHEDULING | Facility: HOME HEALTH | Age: 84
End: 2019-03-26
Payer: MEDICARE

## 2019-03-26 VITALS
HEART RATE: 68 BPM | TEMPERATURE: 98.2 F | OXYGEN SATURATION: 98 % | DIASTOLIC BLOOD PRESSURE: 62 MMHG | RESPIRATION RATE: 20 BRPM | SYSTOLIC BLOOD PRESSURE: 126 MMHG

## 2019-03-26 PROCEDURE — 3331090003 HH PPS REVENUE ADJ

## 2019-03-26 PROCEDURE — 3331090001 HH PPS REVENUE CREDIT

## 2019-03-26 PROCEDURE — 3331090002 HH PPS REVENUE DEBIT

## 2019-03-26 PROCEDURE — G0299 HHS/HOSPICE OF RN EA 15 MIN: HCPCS

## 2019-03-29 ENCOUNTER — HOME CARE VISIT (OUTPATIENT)
Dept: HOME HEALTH SERVICES | Facility: HOME HEALTH | Age: 84
End: 2019-03-29
Payer: MEDICARE

## 2019-04-16 ENCOUNTER — OFFICE VISIT (OUTPATIENT)
Dept: NEUROLOGY | Age: 84
End: 2019-04-16

## 2019-04-16 ENCOUNTER — TELEPHONE (OUTPATIENT)
Dept: NEUROLOGY | Age: 84
End: 2019-04-16

## 2019-04-16 ENCOUNTER — HOSPITAL ENCOUNTER (OUTPATIENT)
Dept: NEUROLOGY | Age: 84
Discharge: HOME | End: 2019-04-16
Attending: INTERNAL MEDICINE
Payer: MEDICARE

## 2019-04-16 VITALS
HEART RATE: 60 BPM | BODY MASS INDEX: 18.4 KG/M2 | OXYGEN SATURATION: 99 % | DIASTOLIC BLOOD PRESSURE: 70 MMHG | HEIGHT: 62 IN | WEIGHT: 100 LBS | SYSTOLIC BLOOD PRESSURE: 168 MMHG

## 2019-04-16 DIAGNOSIS — I61.9 NONTRAUMATIC INTRACEREBRAL HEMORRHAGE, UNSPECIFIED CEREBRAL LOCATION, UNSPECIFIED LATERALITY (HCC): ICD-10-CM

## 2019-04-16 DIAGNOSIS — I10 ESSENTIAL HYPERTENSION: Primary | ICD-10-CM

## 2019-04-16 DIAGNOSIS — I67.89 CEREBRAL MICROVASCULAR DISEASE: ICD-10-CM

## 2019-04-16 DIAGNOSIS — R41.3 DISTURBANCE OF MEMORY: ICD-10-CM

## 2019-04-16 DIAGNOSIS — R55 CONVULSIVE SYNCOPE: ICD-10-CM

## 2019-04-16 DIAGNOSIS — I63.9 INFARCTION OF RIGHT BASAL GANGLIA (HCC): ICD-10-CM

## 2019-04-16 DIAGNOSIS — R41.82 ALTERED MENTAL STATUS, UNSPECIFIED ALTERED MENTAL STATUS TYPE: ICD-10-CM

## 2019-04-16 DIAGNOSIS — G93.40 ENCEPHALOPATHY ACUTE: ICD-10-CM

## 2019-04-16 DIAGNOSIS — E03.4 HYPOTHYROIDISM DUE TO ACQUIRED ATROPHY OF THYROID: ICD-10-CM

## 2019-04-16 DIAGNOSIS — I65.23 BILATERAL CAROTID ARTERY STENOSIS: ICD-10-CM

## 2019-04-16 DIAGNOSIS — E53.8 B12 DEFICIENCY: ICD-10-CM

## 2019-04-16 PROCEDURE — 95816 EEG AWAKE AND DROWSY: CPT

## 2019-04-16 RX ORDER — MULTIVIT WITH MINERALS/HERBS
1 TABLET ORAL DAILY
COMMUNITY

## 2019-04-16 NOTE — PROGRESS NOTES
Consult REFERRED BY: 
Henna Mooney MD 
 
CHIEF COMPLAINT: Memory loss and vagal syncopal episode Subjective:  
 
Lydia De Guzman is a 80 y.o. right-handed -American female seen as a new patient to me at the request of Dr. Catia Cavanaugh for evaluation of several episodes of altered mental status that occurred when she was in [place] end of last year where she had some vagal type syncopal episodes according to the son, and was seen in the emergency room in [place] and had a negative MRI or CT of the brain, and she was moved back here and since she has been here she is had no other spells for the last 2-3 months, and her son says she is doing amazingly well and her confusion and agitation is also gotten better also. 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 Patient already on Aricept 10 mg a day and tolerating it well and it seems to also help with her memory. Her memory seems to be mild dysfunction, and the son does not think she needs any new medication yet. She is had no other unusual headache, fever, trauma, meningismus, or new focal neurologic deficit in years. She is able to function very well, works around the house with her son's wife, cooks with her, and remains very active. She denies any major depression, agitation, and her only medications are labetalol and Aricept. We did recommend a One-A-Day vitamin they will start that also. Her blood pressure is under good control. She did have a hip replacement once in the past.  We will check a carotid Doppler study done today, and results are still pending.   We will otherwise check her again in 6 months time or earlier as needed. She may eventually be a candidate for Namenda. She also may need neuropsych testing, but they are not anxious for that at this time. Past Medical History:  
Diagnosis Date  Ankle fracture 01/03/2019  
 [facility]  
 Hip pain, right 02/04/2019  Hypertension  Infarction of right basal ganglia ([facility] [address] 12/15/2014 Right basal ganglionic hemorrhagic infarct  Unresponsive episode History reviewed. No pertinent surgical history. Family History Problem Relation Age of Onset  Diabetes Mother  Other Father  Alcohol abuse Brother  Diabetes Brother  Cancer Sister Social History Tobacco Use  Smoking status: Never Smoker  Smokeless tobacco: Never Used Substance Use Topics  Alcohol use: No  
   
 
Current Outpatient Medications:  
  b complex vitamins (B COMPLEX 1) tablet, Take 1 Tab by mouth daily. , Disp: , Rfl:  
  labetalol (NORMODYNE) 300 mg tablet, Take 1 Tab by mouth two (2) times a day., Disp: 180 Tab, Rfl: 3 
  donepezil (ARICEPT) 10 mg tablet, Take 1 Tab by mouth nightly., Disp: 90 Tab, Rfl: 11 Allergies Allergen Reactions  Pcn [Penicillins] Unknown (comments) MRI Results (most recent): No results found for this or any previous visit. No results found for this or any previous visit. Review of Systems: 
Review of systems not obtained due to patient factors. Vitals:  
 04/16/19 1350 BP: 168/70 Pulse: 60 SpO2: 99% Weight: 100 lb (45.4 kg) Height: 5' 2\" (1.575 m) Objective: I 
 
 
NEUROLOGICAL EXAM: 
 
Appearance: The patient is well developed, well nourished, provides a fair history and is in no acute distress.   
Mental Status: Oriented to time, place and person, and the president, patient cannot do serial sevens at all, and struggles with even 10-7, and can remember 0 of 3 words at 30 seconds with distraction, and cannot spell world backward, and has difficulty drawing a clock that shows a time 10:50. Cognitive function is mildly abnormal and speech is fluent and no aphasia or dysarthria. Mood and affect appropriate. Cranial Nerves:   Intact visual fields. Fundi are benign, disc are flat, no lesions seen on funduscopy. JAMESON, EOM's full, no nystagmus, no ptosis. Facial sensation is normal. Corneal reflexes are not tested. Facial movement is asymmetric. Hearing is abnormal bilaterally. Palate is midline with normal sternocleidomastoid and trapezius muscles are normal. Tongue is midline. Neck without meningismus or bruits Temporal arteries are not tender or enlarged TMJ areas are not tender on palpation Motor:  4/5 strength in upper and lower proximal and distal muscles. Normal bulk and tone. No fasciculations. Rapid alternating movement is symmetric and intact bilaterally Reflexes:   Deep tendon reflexes 1+/4 and symmetrical. 
No babinski or clonus present Sensory:   Normal to touch, pinprick and vibration and temperature mildly decreased in both feet. DSS is intact Gait:  Normal gait for patient's age but she does move slowly and is a little wobbly. Tremor:   No tremor noted. Cerebellar:  No abnormal cerebellar signs present on Romberg and tandem testing and finger-nose-finger exam.  
Neurovascular:  Normal heart sounds and regular rhythm, peripheral pulses decreased, and no carotid bruits. Assessment: ICD-10-CM ICD-9-CM 1. Essential hypertension I10 401.9 DUPLEX CAROTID BILATERAL  
   VITAMIN B12 & FOLATE  
   TSH 3RD GENERATION 2. Nontraumatic intracerebral hemorrhage, unspecified cerebral location, unspecified laterality ([facility] [address] I61.9 431 DUPLEX CAROTID BILATERAL  
   VITAMIN B12 & FOLATE  
   TSH 3RD GENERATION 3. Encephalopathy acute G93.40 348.30 DUPLEX CAROTID BILATERAL  
   VITAMIN B12 & FOLATE  
   TSH 3RD GENERATION 4.  Bilateral carotid artery stenosis I65.23 433.10 DUPLEX CAROTID BILATERAL  
  433.30 VITAMIN B12 & FOLATE  
   TSH 3RD GENERATION 5. Cerebral microvascular disease I67.9 437.9 DUPLEX CAROTID BILATERAL  
   VITAMIN B12 & FOLATE  
   TSH 3RD GENERATION 6. Disturbance of memory R41.3 780.93 DUPLEX CAROTID BILATERAL  
   VITAMIN B12 & FOLATE  
   TSH 3RD GENERATION 7. Altered mental status, unspecified altered mental status type R41.82 780.97 DUPLEX CAROTID BILATERAL  
   VITAMIN B12 & FOLATE  
   TSH 3RD GENERATION 8. Convulsive syncope R55 780.2 DUPLEX CAROTID BILATERAL  
  780.39 VITAMIN B12 & FOLATE  
   TSH 3RD GENERATION 9. B12 deficiency E53.8 266.2 DUPLEX CAROTID BILATERAL  
   VITAMIN B12 & FOLATE  
   TSH 3RD GENERATION 10. Hypothyroidism due to acquired atrophy of thyroid E03.4 244.8 DUPLEX CAROTID BILATERAL  
  246.8 VITAMIN B12 & FOLATE  
   TSH 3RD GENERATION Active Problems: * No active hospital problems. * 
 
 
Plan:  
 
Patient seems fairly stable today, we will continue current medication, check a carotid Doppler study today and also check her thyroid and B12 levels to make sure there is no other cause of her memory loss. She is to stay off the aspirin because of her past history of intracranial hemorrhage, patient is advised to stay mentally and physically active at the best way to stay healthy and to preserve her memory We went over the risk factors for stroke as being blood pressure, smoking, which she does not do, cholesterol in good diet, and the patient and her family and her PCP are all managing those risk factors. She is advised of the warning signs of stroke as far as be fast, which is balance eyes, face, arms, sensory and time They will call if any problem in the interim and we will see her again in 6 months time assuming her Dopplers are unremarkable 1 hour spent with the patient, reviewing her records, reviewing her scans personally on the PACS system, reviewing her EEG personally, discussing her diagnosis and prognosis and further treatment options with the patient and her son in detail. Signed By: Wilma Razo MD   
 April 16, 2019 CC: Kendall Llanos MD 
FAX: [phone] This note will not be viewable in [address]

## 2019-04-16 NOTE — LETTER
4/16/2019 3:03 PM 
 
Patient:  Nickie Briceno YOB: 1929 Date of Visit: 4/16/2019 Dear No Recipients: Thank you for referring Ms. Fran Nuñez to me for evaluation/treatment. Below are the relevant portions of my assessment and plan of care. Consult REFERRED BY: 
Argelia King MD 
 
CHIEF COMPLAINT: Memory loss and vagal syncopal episode Subjective:  
 
Nickie Briceno is a 80 y.o. right-handed -American female seen as a new patient to me at the request of Dr. Rhina Fan for evaluation of several episodes of altered mental status that occurred when she was in [place] end of last year where she had some vagal type syncopal episodes according to the son, and was seen in the emergency room in [place] and had a negative MRI or CT of the brain, and she was moved back here and since she has been here she is had no other spells for the last 2-3 months, and her son says she is doing amazingly well and her confusion and agitation is also gotten better also. 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 Patient already on Aricept 10 mg a day and tolerating it well and it seems to also help with her memory. Her memory seems to be mild dysfunction, and the son does not think she needs any new medication yet. She is had no other unusual headache, fever, trauma, meningismus, or new focal neurologic deficit in years. She is able to function very well, works around the house with her son's wife, cooks with her, and remains very active.   She denies any major depression, agitation, and her only medications are labetalol and Aricept. We did recommend a One-A-Day vitamin they will start that also. Her blood pressure is under good control. She did have a hip replacement once in the past.  We will check a carotid Doppler study done today, and results are still pending. We will otherwise check her again in 6 months time or earlier as needed. She may eventually be a candidate for Namenda. She also may need neuropsych testing, but they are not anxious for that at this time. Past Medical History:  
Diagnosis Date  Ankle fracture 01/03/2019  
 [facility]  
 Hip pain, right 02/04/2019  Hypertension  Infarction of right basal ganglia ([facility] [address] 12/15/2014 Right basal ganglionic hemorrhagic infarct  Unresponsive episode History reviewed. No pertinent surgical history. Family History Problem Relation Age of Onset  Diabetes Mother  Other Father  Alcohol abuse Brother  Diabetes Brother  Cancer Sister Social History Tobacco Use  Smoking status: Never Smoker  Smokeless tobacco: Never Used Substance Use Topics  Alcohol use: No  
   
 
Current Outpatient Medications:  
  b complex vitamins (B COMPLEX 1) tablet, Take 1 Tab by mouth daily. , Disp: , Rfl:  
  labetalol (NORMODYNE) 300 mg tablet, Take 1 Tab by mouth two (2) times a day., Disp: 180 Tab, Rfl: 3 
  donepezil (ARICEPT) 10 mg tablet, Take 1 Tab by mouth nightly., Disp: 90 Tab, Rfl: 11 Allergies Allergen Reactions  Pcn [Penicillins] Unknown (comments) MRI Results (most recent): No results found for this or any previous visit. No results found for this or any previous visit. Review of Systems: 
Review of systems not obtained due to patient factors. Vitals:  
 04/16/19 1350 BP: 168/70 Pulse: 60 SpO2: 99% Weight: 100 lb (45.4 kg) Height: 5' 2\" (1.575 m) Objective: I 
 
 
NEUROLOGICAL EXAM: 
 
 Appearance: The patient is well developed, well nourished, provides a fair history and is in no acute distress. Mental Status: Oriented to time, place and person, and the president, patient cannot do serial sevens at all, and struggles with even 10-7, and can remember 0 of 3 words at 30 seconds with distraction, and cannot spell world backward, and has difficulty drawing a clock that shows a time 10:50. Cognitive function is mildly abnormal and speech is fluent and no aphasia or dysarthria. Mood and affect appropriate. Cranial Nerves:   Intact visual fields. Fundi are benign, disc are flat, no lesions seen on funduscopy. JAMESON, EOM's full, no nystagmus, no ptosis. Facial sensation is normal. Corneal reflexes are not tested. Facial movement is asymmetric. Hearing is abnormal bilaterally. Palate is midline with normal sternocleidomastoid and trapezius muscles are normal. Tongue is midline. Neck without meningismus or bruits Temporal arteries are not tender or enlarged TMJ areas are not tender on palpation Motor:  4/5 strength in upper and lower proximal and distal muscles. Normal bulk and tone. No fasciculations. Rapid alternating movement is symmetric and intact bilaterally Reflexes:   Deep tendon reflexes 1+/4 and symmetrical. 
No babinski or clonus present Sensory:   Normal to touch, pinprick and vibration and temperature mildly decreased in both feet. DSS is intact Gait:  Normal gait for patient's age but she does move slowly and is a little wobbly. Tremor:   No tremor noted. Cerebellar:  No abnormal cerebellar signs present on Romberg and tandem testing and finger-nose-finger exam.  
Neurovascular:  Normal heart sounds and regular rhythm, peripheral pulses decreased, and no carotid bruits. Assessment: ICD-10-CM ICD-9-CM 1.  Essential hypertension I10 401.9 DUPLEX CAROTID BILATERAL  
   VITAMIN B12 & FOLATE  
   TSH 3RD GENERATION  
 2. Nontraumatic intracerebral hemorrhage, unspecified cerebral location, unspecified laterality ([facility]) I61.9 431 DUPLEX CAROTID BILATERAL  
   VITAMIN B12 & FOLATE  
   TSH 3RD GENERATION 3. Encephalopathy acute G93.40 348.30 DUPLEX CAROTID BILATERAL  
   VITAMIN B12 & FOLATE  
   TSH 3RD GENERATION 4. Bilateral carotid artery stenosis I65.23 433.10 DUPLEX CAROTID BILATERAL  
  433.30 VITAMIN B12 & FOLATE  
   TSH 3RD GENERATION 5. Cerebral microvascular disease I67.9 437.9 DUPLEX CAROTID BILATERAL  
   VITAMIN B12 & FOLATE  
   TSH 3RD GENERATION 6. Disturbance of memory R41.3 780.93 DUPLEX CAROTID BILATERAL  
   VITAMIN B12 & FOLATE  
   TSH 3RD GENERATION 7. Altered mental status, unspecified altered mental status type R41.82 780.97 DUPLEX CAROTID BILATERAL  
   VITAMIN B12 & FOLATE  
   TSH 3RD GENERATION 8. Convulsive syncope R55 780.2 DUPLEX CAROTID BILATERAL  
  780.39 VITAMIN B12 & FOLATE  
   TSH 3RD GENERATION 9. B12 deficiency E53.8 266.2 DUPLEX CAROTID BILATERAL  
   VITAMIN B12 & FOLATE  
   TSH 3RD GENERATION 10. Hypothyroidism due to acquired atrophy of thyroid E03.4 244.8 DUPLEX CAROTID BILATERAL  
  246.8 VITAMIN B12 & FOLATE  
   TSH 3RD GENERATION Active Problems: * No active hospital problems. * 
 
 
Plan:  
 
Patient seems fairly stable today, we will continue current medication, check a carotid Doppler study today and also check her thyroid and B12 levels to make sure there is no other cause of her memory loss. She is to stay off the aspirin because of her past history of intracranial hemorrhage, patient is advised to stay mentally and physically active at the best way to stay healthy and to preserve her memory We went over the risk factors for stroke as being blood pressure, smoking, which she does not do, cholesterol in good diet, and the patient and her family and her PCP are all managing those risk factors.   She is advised of the warning signs of stroke as far as be fast, which is balance eyes, face, arms, sensory and time They will call if any problem in the interim and we will see her again in 6 months time assuming her Dopplers are unremarkable 1 hour spent with the patient, reviewing her records, reviewing her scans personally on the PACS system, reviewing her EEG personally, discussing her diagnosis and prognosis and further treatment options with the patient and her son in detail. Signed By: Pancho Schafer MD   
 April 16, 2019 CC: Katelyn Fink MD 
FAX: [phone] This note will not be viewable in [address] If you have questions, please do not hesitate to call me. I look forward to following Ms. Juan Manuel Obregon along with you. Sincerely, Pancho Schafer MD

## 2019-04-16 NOTE — LETTER
4/16/19 Patient: Naida Judge YOB: 1929 Date of Visit: 4/16/2019 Lisset Araiza MD 
[place] [id number] VIA In Basket Dear Lisset Araiza MD, Thank you for referring Ms. Lia Boss to [address] [place] [address] for evaluation. My notes for this consultation are attached. If you have questions, please do not hesitate to call me. I look forward to following your patient along with you. Sincerely, Maira Kumar MD

## 2019-04-16 NOTE — PATIENT INSTRUCTIONS
A Healthy Lifestyle: Care Instructions Your Care Instructions A healthy lifestyle can help you feel good, stay at a healthy weight, and have plenty of energy for both work and play. A healthy lifestyle is something you can share with your whole family. A healthy lifestyle also can lower your risk for serious health problems, such as high blood pressure, heart disease, and diabetes. You can follow a few steps listed below to improve your health and the health of your family. Follow-up care is a key part of your treatment and safety. Be sure to make and go to all appointments, and call your doctor if you are having problems. It's also a good idea to know your test results and keep a list of the medicines you take. How can you care for yourself at home? · Do not eat too much sugar, fat, or fast foods. You can still have dessert and treats now and then. The goal is moderation. · Start small to improve your eating habits. Pay attention to portion sizes, drink less juice and soda pop, and eat more fruits and vegetables. ? Eat a healthy amount of food. A 3-ounce serving of meat, for example, is about the size of a deck of cards. Fill the rest of your plate with vegetables and whole grains. ? Limit the amount of soda and sports drinks you have every day. Drink more water when you are thirsty. ? Eat at least 5 servings of fruits and vegetables every day. It may seem like a lot, but it is not hard to reach this goal. A serving or helping is 1 piece of fruit, 1 cup of vegetables, or 2 cups of leafy, raw vegetables. Have an apple or some carrot sticks as an afternoon snack instead of a candy bar. Try to have fruits and/or vegetables at every meal. 
· Make exercise part of your daily routine. You may want to start with simple activities, such as walking, bicycling, or slow swimming. Try to be active 30 to 60 minutes every day.  You do not need to do all 30 to 60 minutes all at once. For example, you can exercise 3 times a day for 10 or 20 minutes. Moderate exercise is safe for most people, but it is always a good idea to talk to your doctor before starting an exercise program. 
· Keep moving. Rachel Knuckles the lawn, work in the garden, or [employer]. Take the stairs instead of the elevator at work. · If you smoke, quit. People who smoke have an increased risk for heart attack, stroke, cancer, and other lung illnesses. Quitting is hard, but there are ways to boost your chance of quitting tobacco for good. ? Use nicotine gum, patches, or lozenges. ? Ask your doctor about stop-smoking programs and medicines. ? Keep trying. In addition to reducing your risk of diseases in the future, you will notice some benefits soon after you stop using tobacco. If you have shortness of breath or asthma symptoms, they will likely get better within a few weeks after you quit. · Limit how much alcohol you drink. Moderate amounts of alcohol (up to 2 drinks a day for men, 1 drink a day for women) are okay. But drinking too much can lead to liver problems, high blood pressure, and other health problems. Family health If you have a family, there are many things you can do together to improve your health. · Eat meals together as a family as often as possible. · Eat healthy foods. This includes fruits, vegetables, lean meats and dairy, and whole grains. · Include your family in your fitness plan. Most people think of activities such as jogging or tennis as the way to fitness, but there are many ways you and your family can be more active. Anything that makes you breathe hard and gets your heart pumping is exercise. Here are some tips: 
? Walk to do errands or to take your child to school or the bus. 
? Go for a family bike ride after dinner instead of watching TV. Where can you learn more? Go to http://rosalina-bonifacio.info/. Enter O156 in the search box to learn more about \"A Healthy Lifestyle: Care Instructions. \" Current as of: September 11, 2018 Content Version: 11.9 © 6438-2368 [employer]. Care instructions adapted under license by [employer] (which disclaims liability or warranty for this information). If you have questions about a medical condition or this instruction, always ask your healthcare professional. [address] any warranty or liability for your use of this information. Office Policieso Phone calls/patient messages: Please allow up to 24 hours for someone in the office to contact you about your call or message. Be mindful your provider may be out of the office or your message may require further review. We encourage you to use [employer] for your messages as this is a faster, more efficient way to communicate with our office 
o Medication Refills: 
Prescription medications require up to 48 business hours to process. We encourage you to use [employer] for your refills. For controlled medications: Please allow up to 72 business hours to process. Certain medications may require you to  a written prescription at our office. NO narcotic/controlled medications will be prescribed after 4pm Monday through Friday or on weekends 
o Form/Paperwork Completion: We ask that you allow 7-14 business days. You may also download your forms to [employer] to have your doctor print off.

## 2019-04-16 NOTE — TELEPHONE ENCOUNTER
----- Message from Isaura Singleton sent at 4/16/2019 11:33 AM EDT -----  Regarding: Dr. Duncan Ortiz  Mr. Geraldine Inch pt's son, stated pt  finished her EEG and would like to come sooner instead of 1:30pm.[phone].

## 2019-04-17 LAB
FOLATE SERPL-MCNC: >20 NG/ML
TSH SERPL DL<=0.005 MIU/L-ACNC: 1.69 UIU/ML (ref 0.45–4.5)
VIT B12 SERPL-MCNC: 503 PG/ML (ref 232–1245)

## 2019-04-17 NOTE — PROCEDURES
[address]  EEG    Name:  Kathy Cage  MR#:  [id number]  :  09/10/1929  ACCOUNT #:  [de-identified]  DATE OF SERVICE:  2019      CLINICAL INDICATION:  The patient is an 60-year-old female with a history of altered mental status and passing out spells. EEG to rule out seizures, rule out convulsive syncope, rule out encephalopathy. Rule out partial complex seizures. The patient with a previous history of stroke and hemorrhagic stroke in the right hemisphere. The patient has possible seizures. EEG CLASSIFICATION:  On this patient is dysrhythmia grade 1, generalized maximum right frontotemporal.    DESCRIPTION OF THE RECORD:  The background of this recording contains a posteriorly located occipital alpha rhythm of 8-9 Hz that does attenuate some with eye opening. During the recording, hyperventilation was not performed. Photic stimulation was performed with a very minimal driving response in the posterior head regions. During the recording, the predominant abnormality seen was a borderline 2-6 Hz increase in theta activity at times and recording with testing somewhat more prominent in the right frontotemporal region as compared to the left. No clear spike and spike-and-wave discharge was seen. The theta activity was somewhat extenuated by periods of drowsiness and the patient did enter brief states of sleep with K complexes and sleep spindles seen in the central head regions. No recorded spells of any type were seen. INTERPRETATION:  This is a borderline electroencephalogram with the patient awake and drowsy showing some slight increase in theta activity. There is some more prominence in the right frontotemporal area suggesting a mild area of cortical disturbance in the right frontotemporal hemisphere. Clinical correlation is recommended, no reported spells or spike discharge is seen.       Gege Garcia MD      [id number]  D:  2019 21:59  T:  2019 3:30  JOB #:  [id number]  CC:   Ethan Ochoa MD

## 2019-06-10 ENCOUNTER — OFFICE VISIT (OUTPATIENT)
Dept: INTERNAL MEDICINE CLINIC | Age: 84
End: 2019-06-10

## 2019-06-10 VITALS
WEIGHT: 104.4 LBS | HEIGHT: 62 IN | BODY MASS INDEX: 19.21 KG/M2 | RESPIRATION RATE: 16 BRPM | OXYGEN SATURATION: 98 % | SYSTOLIC BLOOD PRESSURE: 121 MMHG | DIASTOLIC BLOOD PRESSURE: 65 MMHG | TEMPERATURE: 97.9 F | HEART RATE: 68 BPM

## 2019-06-10 DIAGNOSIS — R68.89 SPELLS OF DECREASED ATTENTIVENESS: ICD-10-CM

## 2019-06-10 DIAGNOSIS — I10 ESSENTIAL HYPERTENSION: Primary | ICD-10-CM

## 2019-06-10 DIAGNOSIS — I63.9 INFARCTION OF RIGHT BASAL GANGLIA (HCC): ICD-10-CM

## 2019-06-10 DIAGNOSIS — I61.9 NONTRAUMATIC INTRACEREBRAL HEMORRHAGE, UNSPECIFIED CEREBRAL LOCATION, UNSPECIFIED LATERALITY (HCC): ICD-10-CM

## 2019-06-10 DIAGNOSIS — S82.891D CLOSED FRACTURE OF RIGHT ANKLE WITH ROUTINE HEALING, SUBSEQUENT ENCOUNTER: ICD-10-CM

## 2019-06-10 DIAGNOSIS — R55 CONVULSIVE SYNCOPE: ICD-10-CM

## 2019-06-10 DIAGNOSIS — R41.89 UNRESPONSIVE EPISODE: ICD-10-CM

## 2019-06-10 NOTE — PROGRESS NOTES
1. Have you been to the ER, urgent care clinic since your last visit? Hospitalized since your last visit? No    2. Have you seen or consulted any other health care providers outside of the [address] [place] [address] since your last visit? Include any pap smears or colon screening.  No       Wants to discuss elevated BP readings

## 2019-06-10 NOTE — PROGRESS NOTES
SPORTS MEDICINE AND PRIMARY [facility]ARE  Julissa Flowers MD, [address] [address]  Phone:  [phone]  Fax: [phone]       Chief Complaint   Patient presents with    Hypertension     f/u   . SUBJECTIVE:    Goyo Groves is a 80 y.o. female Patient returns today with history of decreased attentiveness, unresponsive episode, infarction of the right basal ganglia, intracerebral hemorrhage, primary hypertension, convulsive syncope, ankle fracture, and primary hypertension. Since we last saw her she saw Dr. William Armas with memory loss and a vagal syncopal episode. He was going to check her carotid dopplers, thyroid and B12 to be sure there is no other cause of memory loss. Advised to stay off aspirin because of intracranial hemorrhage history. Advised to stay mentally active and physically active to preserve her memory. We went over risk factors for stroke and advised her of the warning signs of a stroke. Current Outpatient Medications   Medication Sig Dispense Refill    b complex vitamins (B COMPLEX 1) tablet Take 1 Tab by mouth daily.  labetalol (NORMODYNE) 300 mg tablet Take 1 Tab by mouth two (2) times a day. 180 Tab 3    donepezil (ARICEPT) 10 mg tablet Take 1 Tab by mouth nightly. 80 Tab 11     Past Medical History:   Diagnosis Date    [facility]    Hip pain, right 02/04/2019    Hypertension     Infarction of right basal ganglia ([facility] [address] 12/15/2014    Right basal ganglionic hemorrhagic infarct    Unresponsive episode      History reviewed. No pertinent surgical history.   Allergies   Allergen Reactions    Pcn [Penicillins] Unknown (comments)         REVIEW OF SYSTEMS:  General: negative for - chills or fever  ENT: negative for - headaches, nasal congestion or tinnitus  Respiratory: negative for - cough, hemoptysis, shortness of breath or wheezing  Cardiovascular : negative for - chest pain, edema, palpitations or shortness of breath  Gastrointestinal: negative for - abdominal pain, blood in stools, heartburn or nausea/vomiting  Genito-Urinary: no dysuria, trouble voiding, or hematuria  Musculoskeletal: negative for - gait disturbance, joint pain, joint stiffness or joint swelling  Neurological: no TIA or stroke symptoms  Hematologic: no bruises, no bleeding, no swollen glands  Integument: no lumps, mole changes, nail changes or rash  Endocrine: no malaise/lethargy or unexpected weight changes      Social History     Socioeconomic History    Marital status: SINGLE     Spouse name: Not on file    Number of children: Not on file    Years of education: Not on file    Highest education level: Not on file   Tobacco Use    Smoking status: Never Smoker    Smokeless tobacco: Never Used   Substance and Sexual Activity    Alcohol use: No    Drug use: No    Sexual activity: Not Currently   Social History Narrative    Medical History: [id number]    Gyn History: Last mammogram date 2013. Surgical History: R hip hemiarthroplasty 2008    Hospitalization/Major Diagnostic Procedure: Denies Past Hospitalization    Family History: Mother: deceasedFather: deceasedSister(s): , ca pancreasBrother(s): aliveDaughter(s): aliveSon(s): alive1    brother(s) . 1 son(s) , 1 daughter(s) . Social History: Alcohol Use Patient does not use alcohol. Smoking Status Patient is a never smoker. Marital Status: W idow . Lives w ith:    alone. Occupation/W ork: beautician.  Yazidi: pilgram Jainism.     Family History   Problem Relation Age of Onset    Diabetes Mother     Other Father     Alcohol abuse Brother     Diabetes Brother     Cancer Sister        OBJECTIVE:    Visit Vitals  /65   Pulse 68   Temp 97.9 °F (36.6 °C) (Oral)   Resp 16   Ht 5' 2\" (1.575 m)   Wt 104 lb 6.4 oz (47.4 kg)   SpO2 98%   BMI 19.10 kg/m²     CONSTITUTIONAL: well , well nourished, appears age appropriate  EYES: perrla, eom intact  ENMT:moist mucous membranes, pharynx clear  NECK: supple. Thyroid normal  RESPIRATORY: Chest: clear bilaterally   CARDIOVASCULAR: Heart: regular rate and rhythm  GASTROINTESTINAL: Abdomen: soft, bowel sounds active  HEMATOLOGIC: no pathological lymph nodes palpated  MUSCULOSKELETAL: Extremities: no edema, pulse 1+   INTEGUMENT: No unusual rashes or suspicious skin lesions noted. Nails appear normal.  NEUROLOGIC: non-focal exam   MENTAL STATUS: alert and oriented, appropriate affect           ASSESSMENT:  1. Essential hypertension    2. Unresponsive episode    3. Spells of decreased attentiveness    4. Infarction of right basal ganglia (HCC)    5. Nontraumatic intracerebral hemorrhage, unspecified cerebral location, unspecified laterality ([facility] [address]    6. Convulsive syncope    7. Closed fracture of right ankle with routine healing, subsequent encounter      Patient is complaining of floaters, for which we advise her to ignore them. She believes the EEG caused the floaters when they did the flashing lights she states. We reassure her that was not the cause. She did not seem to agree with this. She has a known history of mild CKD with GFR of 45 in January. We would like to repeat the renal function. We advise her of the results of the studies that Dr. Maurice Mendosa performed. Son tells me that he spoke with Dr. Maurice Mendosa regarding the results. She has had no further syncope episodes. Son notes she is generally improving. She came down here with a wheelchair and subsequently graduated to a walker and cane and now is walking independently. She will be back to see us in four to six months or as needed. I have discussed the diagnosis with the patient and the intended plan as seen in the  orders above. The patient understands and agees with the plan. The patient has   received an after visit summary and questions were answered concerning  future plans  Patient labs and/or xrays were reviewed  Past records were reviewed.     PLAN:  .  Orders Placed This Encounter    RENAL FUNCTION PANEL    LIPID PANEL    REFERRAL TO OPHTHALMOLOGY                  ATTENTION:   This medical record was transcribed using an electronic medical records system. Although proofread, it may and can contain electronic and spelling errors. Other human spelling and other errors may be present. Corrections may be executed at a later time. Please feel free to contact us for any clarifications as needed.

## 2019-06-11 LAB
ALBUMIN SERPL-MCNC: 4.2 G/DL (ref 3.5–4.7)
BUN SERPL-MCNC: 21 MG/DL (ref 8–27)
BUN/CREAT SERPL: 16 (ref 12–28)
CALCIUM SERPL-MCNC: 9.2 MG/DL (ref 8.7–10.3)
CHLORIDE SERPL-SCNC: 107 MMOL/L (ref 96–106)
CHOLEST SERPL-MCNC: 194 MG/DL (ref 100–199)
CO2 SERPL-SCNC: 22 MMOL/L (ref 20–29)
CREAT SERPL-MCNC: 1.33 MG/DL (ref 0.57–1)
GLUCOSE SERPL-MCNC: 84 MG/DL (ref 65–99)
HDLC SERPL-MCNC: 55 MG/DL
LDLC SERPL CALC-MCNC: 118 MG/DL (ref 0–99)
PHOSPHATE SERPL-MCNC: 3.3 MG/DL (ref 2.5–4.5)
POTASSIUM SERPL-SCNC: 4.6 MMOL/L (ref 3.5–5.2)
SODIUM SERPL-SCNC: 145 MMOL/L (ref 134–144)
TRIGL SERPL-MCNC: 105 MG/DL (ref 0–149)
VLDLC SERPL CALC-MCNC: 21 MG/DL (ref 5–40)

## 2019-07-29 ENCOUNTER — HOSPITAL ENCOUNTER (EMERGENCY)
Age: 84
Discharge: HOME | End: 2019-07-29
Attending: EMERGENCY MEDICINE
Payer: MEDICARE

## 2019-07-29 VITALS
HEIGHT: 60 IN | RESPIRATION RATE: 18 BRPM | TEMPERATURE: 97.9 F | SYSTOLIC BLOOD PRESSURE: 106 MMHG | HEART RATE: 58 BPM | WEIGHT: 100.53 LBS | DIASTOLIC BLOOD PRESSURE: 78 MMHG | BODY MASS INDEX: 19.74 KG/M2 | OXYGEN SATURATION: 100 %

## 2019-07-29 DIAGNOSIS — N30.00 ACUTE CYSTITIS WITHOUT HEMATURIA: Primary | ICD-10-CM

## 2019-07-29 LAB
ALBUMIN SERPL-MCNC: 3.6 G/DL (ref 3.5–5)
ALBUMIN/GLOB SERPL: 1 {RATIO} (ref 1.1–2.2)
ALP SERPL-CCNC: 87 U/L (ref 45–117)
ALT SERPL-CCNC: 15 U/L (ref 12–78)
ANION GAP SERPL CALC-SCNC: 5 MMOL/L (ref 5–15)
APPEARANCE UR: CLEAR
AST SERPL-CCNC: 17 U/L (ref 15–37)
BACTERIA URNS QL MICRO: ABNORMAL /HPF
BASOPHILS # BLD: 0 K/UL (ref 0–0.1)
BASOPHILS NFR BLD: 1 % (ref 0–1)
BILIRUB SERPL-MCNC: 0.4 MG/DL (ref 0.2–1)
BILIRUB UR QL: NEGATIVE
BUN SERPL-MCNC: 19 MG/DL (ref 6–20)
BUN/CREAT SERPL: 14 (ref 12–20)
CALCIUM SERPL-MCNC: 8.7 MG/DL (ref 8.5–10.1)
CHLORIDE SERPL-SCNC: 109 MMOL/L (ref 97–108)
CO2 SERPL-SCNC: 27 MMOL/L (ref 21–32)
COLOR UR: ABNORMAL
CREAT SERPL-MCNC: 1.39 MG/DL (ref 0.55–1.02)
DIFFERENTIAL METHOD BLD: ABNORMAL
EOSINOPHIL # BLD: 0.1 K/UL (ref 0–0.4)
EOSINOPHIL NFR BLD: 2 % (ref 0–7)
EPITH CASTS URNS QL MICRO: ABNORMAL /LPF
ERYTHROCYTE [DISTWIDTH] IN BLOOD BY AUTOMATED COUNT: 13.8 % (ref 11.5–14.5)
GLOBULIN SER CALC-MCNC: 3.6 G/DL (ref 2–4)
GLUCOSE SERPL-MCNC: 89 MG/DL (ref 65–100)
GLUCOSE UR STRIP.AUTO-MCNC: NEGATIVE MG/DL
HCT VFR BLD AUTO: 34.5 % (ref 35–47)
HGB BLD-MCNC: 11.1 G/DL (ref 11.5–16)
HGB UR QL STRIP: NEGATIVE
IMM GRANULOCYTES # BLD AUTO: 0 K/UL (ref 0–0.04)
IMM GRANULOCYTES NFR BLD AUTO: 0 % (ref 0–0.5)
KETONES UR QL STRIP.AUTO: NEGATIVE MG/DL
LEUKOCYTE ESTERASE UR QL STRIP.AUTO: ABNORMAL
LIPASE SERPL-CCNC: 92 U/L (ref 73–393)
LYMPHOCYTES # BLD: 1.6 K/UL (ref 0.8–3.5)
LYMPHOCYTES NFR BLD: 29 % (ref 12–49)
MCH RBC QN AUTO: 29.8 PG (ref 26–34)
MCHC RBC AUTO-ENTMCNC: 32.2 G/DL (ref 30–36.5)
MCV RBC AUTO: 92.5 FL (ref 80–99)
MONOCYTES # BLD: 0.4 K/UL (ref 0–1)
MONOCYTES NFR BLD: 8 % (ref 5–13)
NEUTS SEG # BLD: 3.3 K/UL (ref 1.8–8)
NEUTS SEG NFR BLD: 60 % (ref 32–75)
NITRITE UR QL STRIP.AUTO: NEGATIVE
NRBC # BLD: 0 K/UL (ref 0–0.01)
NRBC BLD-RTO: 0 PER 100 WBC
OTHER,OTHU: ABNORMAL
PH UR STRIP: 5.5 [PH] (ref 5–8)
PLATELET # BLD AUTO: 208 K/UL (ref 150–400)
PMV BLD AUTO: 9.5 FL (ref 8.9–12.9)
POTASSIUM SERPL-SCNC: 3.7 MMOL/L (ref 3.5–5.1)
PROT SERPL-MCNC: 7.2 G/DL (ref 6.4–8.2)
PROT UR STRIP-MCNC: NEGATIVE MG/DL
RBC # BLD AUTO: 3.73 M/UL (ref 3.8–5.2)
RBC #/AREA URNS HPF: ABNORMAL /HPF (ref 0–5)
SODIUM SERPL-SCNC: 141 MMOL/L (ref 136–145)
SP GR UR REFRACTOMETRY: 1.01 (ref 1–1.03)
UROBILINOGEN UR QL STRIP.AUTO: 0.2 EU/DL (ref 0.2–1)
WBC # BLD AUTO: 5.4 K/UL (ref 3.6–11)
WBC URNS QL MICRO: ABNORMAL /HPF (ref 0–4)

## 2019-07-29 PROCEDURE — 81001 URINALYSIS AUTO W/SCOPE: CPT

## 2019-07-29 PROCEDURE — 80053 COMPREHEN METABOLIC PANEL: CPT

## 2019-07-29 PROCEDURE — 83690 ASSAY OF LIPASE: CPT

## 2019-07-29 PROCEDURE — 85025 COMPLETE CBC W/AUTO DIFF WBC: CPT

## 2019-07-29 PROCEDURE — 99284 EMERGENCY DEPT VISIT MOD MDM: CPT

## 2019-07-29 PROCEDURE — 36415 COLL VENOUS BLD VENIPUNCTURE: CPT

## 2019-07-29 RX ORDER — SULFAMETHOXAZOLE AND TRIMETHOPRIM 800; 160 MG/1; MG/1
1 TABLET ORAL 2 TIMES DAILY
Qty: 14 TAB | Refills: 0 | Status: SHIPPED | OUTPATIENT
Start: 2019-07-29 | End: 2019-08-05

## 2019-07-29 NOTE — DISCHARGE INSTRUCTIONS

## 2019-07-29 NOTE — ED TRIAGE NOTES
Pt. Presents to ED today for complaints of urinary frequency, odor, and lower abdominal pain. Pt. Alert and oriented x4. Pt. Placed in position of comfort with call bell in reach.

## 2019-07-29 NOTE — ED NOTES
Patient discharged by Dr. Virginia Moon. Patient provided with discharge instructions Rx and instructions on follow up care. Patient out of ED ambulatory accompanied by family.

## 2019-08-01 NOTE — ED PROVIDER NOTES
EMERGENCY DEPARTMENT HISTORY AND PHYSICAL EXAM      Date: 7/29/2019  Patient Name: Conni Hammans  Patient Age and Sex: 80 y.o. female    History of Presenting Illness     Chief Complaint   Patient presents with    Urinary Burning     pt ambulatory to triage. pt complaining of burning when she urinates since Saturday. pt states that has increase in urination    Diarrhea     since Saturday       History Provided By: Patient's Son, Patient    HPI: Conni Hammans, 80 y.o. female with a PMHx of dementia, HTN presents to the ED with increased urinary frequency, urgency and dysuria. Pt denies any other alleviating or exacerbating factors. There are no other complaints, changes or physical findings at this time. Past Medical History:   Diagnosis Date    [facility]    Hip pain, right 02/04/2019    Hypertension     Infarction of right basal ganglia ([facility] [address] 12/15/2014    Right basal ganglionic hemorrhagic infarct    Unresponsive episode      History reviewed. No pertinent surgical history. PCP: Etienne Campos MD    Past History   Past Medical History:  Past Medical History:   Diagnosis Date    [facility] fracture 01/03/2019    [facility]    Hip pain, right 02/04/2019    Hypertension     Infarction of right basal ganglia ([address] 12/15/2014    Right basal ganglionic hemorrhagic infarct    Unresponsive episode        Past Surgical History:  History reviewed. No pertinent surgical history. Family History:  Family History   Problem Relation Age of Onset    Diabetes Mother     Other Father     Alcohol abuse Brother     Diabetes Brother     Cancer Sister        Social History:  Social History     Tobacco Use    Smoking status: Never Smoker    Smokeless tobacco: Never Used   Substance Use Topics    Alcohol use: No    Drug use: No       Allergies:   Allergies   Allergen Reactions    Pcn [Penicillins] Unknown (comments)       Current Medications:  No current facility-administered medications on file prior to encounter. Current Outpatient Medications on File Prior to Encounter   Medication Sig Dispense Refill    b complex vitamins (B COMPLEX 1) tablet Take 1 Tab by mouth daily.  labetalol (NORMODYNE) 300 mg tablet Take 1 Tab by mouth two (2) times a day. 180 Tab 3    donepezil (ARICEPT) 10 mg tablet Take 1 Tab by mouth nightly. 90 Tab 11       Review of Systems   Review of Systems   Constitutional: Negative. Negative for appetite change, chills and fever. HENT: Negative for congestion, ear pain, rhinorrhea, sinus pain, trouble swallowing and voice change. Respiratory: Negative for cough, chest tightness, shortness of breath, wheezing and stridor. Cardiovascular: Negative for chest pain, palpitations and leg swelling. Gastrointestinal: Negative for abdominal pain, blood in stool, constipation, diarrhea, nausea and vomiting. Genitourinary: Positive for dysuria and frequency. Negative for difficulty urinating, flank pain and hematuria. Musculoskeletal: Negative for arthralgias and joint swelling. Skin: Negative. Neurological: Negative for dizziness, syncope, weakness, numbness and headaches. All other systems reviewed and are negative. Physical Exam   Physical Exam   Constitutional: She is oriented to person, place, and time. She appears well-developed and well-nourished. She does not appear ill. No distress. HENT:   Head: Atraumatic. Right Ear: External ear normal.   Left Ear: External ear normal.   Mouth/Throat: Oropharynx is clear and moist and mucous membranes are normal.   Eyes: Pupils are equal, round, and reactive to light. Conjunctivae and EOM are normal. No scleral icterus. Neck: Normal range of motion and full passive range of motion without pain. Neck supple. No JVD present. No spinous process tenderness present. Carotid bruit is not present. No thyroid mass and no thyromegaly present.    Cardiovascular: Normal rate, regular rhythm, normal heart sounds, intact distal pulses and normal pulses. No murmur heard. Pulmonary/Chest: Effort normal and breath sounds normal. No respiratory distress. She exhibits no tenderness and no crepitus. Abdominal: Soft. Normal appearance and bowel sounds are normal. She exhibits no distension. There is no hepatosplenomegaly. There is no tenderness. Musculoskeletal: Normal range of motion. She exhibits no edema, tenderness or deformity. Lymphadenopathy:     She has no cervical adenopathy. Neurological: She is alert and oriented to person, place, and time. She has normal strength and normal reflexes. No cranial nerve deficit or sensory deficit. Skin: Skin is warm and dry. She is not diaphoretic. Psychiatric: She has a normal mood and affect. Nursing note and vitals reviewed. Diagnostic Study Results     Labs -  Recent Results (from the past 200 hour(s))   CBC WITH AUTOMATED DIFF    Collection Time: 07/29/19 12:17 PM   Result Value Ref Range    WBC 5.4 3.6 - 11.0 K/uL    RBC 3.73 (L) 3.80 - 5.20 M/uL    HGB 11.1 (L) 11.5 - 16.0 g/dL    HCT 34.5 (L) 35.0 - 47.0 %    MCV 92.5 80.0 - 99.0 FL    MCH 29.8 26.0 - 34.0 PG    MCHC 32.2 30.0 - 36.5 g/dL    RDW 13.8 11.5 - 14.5 %    PLATELET 100 730 - 678 K/uL    MPV 9.5 8.9 - 12.9 FL    NRBC 0.0 0  WBC    ABSOLUTE NRBC 0.00 0.00 - 0.01 K/uL    NEUTROPHILS 60 32 - 75 %    LYMPHOCYTES 29 12 - 49 %    MONOCYTES 8 5 - 13 %    EOSINOPHILS 2 0 - 7 %    BASOPHILS 1 0 - 1 %    IMMATURE GRANULOCYTES 0 0.0 - 0.5 %    ABS. NEUTROPHILS 3.3 1.8 - 8.0 K/UL    ABS. LYMPHOCYTES 1.6 0.8 - 3.5 K/UL    ABS. MONOCYTES 0.4 0.0 - 1.0 K/UL    ABS. EOSINOPHILS 0.1 0.0 - 0.4 K/UL    ABS. BASOPHILS 0.0 0.0 - 0.1 K/UL    ABS. IMM.  GRANS. 0.0 0.00 - 0.04 K/UL    DF AUTOMATED     METABOLIC PANEL, COMPREHENSIVE    Collection Time: 07/29/19 12:17 PM   Result Value Ref Range    Sodium 141 136 - 145 mmol/L    Potassium 3.7 3.5 - 5.1 mmol/L    Chloride 109 (H) 97 - 108 mmol/L    CO2 27 21 - 32 mmol/L    Anion gap 5 5 - 15 mmol/L    Glucose 89 65 - 100 mg/dL    BUN 19 6 - 20 MG/DL    Creatinine 1.39 (H) 0.55 - 1.02 MG/DL    BUN/Creatinine ratio 14 12 - 20      GFR est AA 43 (L) >60 ml/min/1.73m2    GFR est non-AA 36 (L) >60 ml/min/1.73m2    Calcium 8.7 8.5 - 10.1 MG/DL    Bilirubin, total 0.4 0.2 - 1.0 MG/DL    ALT (SGPT) 15 12 - 78 U/L    AST (SGOT) 17 15 - 37 U/L    Alk. phosphatase 87 45 - 117 U/L    Protein, total 7.2 6.4 - 8.2 g/dL    Albumin 3.6 3.5 - 5.0 g/dL    Globulin 3.6 2.0 - 4.0 g/dL    A-G Ratio 1.0 (L) 1.1 - 2.2     LIPASE    Collection Time: 07/29/19 12:17 PM   Result Value Ref Range    Lipase 92 73 - 393 U/L   URINALYSIS W/ RFLX MICROSCOPIC    Collection Time: 07/29/19  3:14 PM   Result Value Ref Range    Color YELLOW/STRAW      Appearance CLEAR CLEAR      Specific gravity 1.015 1.003 - 1.030      pH (UA) 5.5 5.0 - 8.0      Protein NEGATIVE  NEG mg/dL    Glucose NEGATIVE  NEG mg/dL    Ketone NEGATIVE  NEG mg/dL    Bilirubin NEGATIVE  NEG      Blood NEGATIVE  NEG      Urobilinogen 0.2 0.2 - 1.0 EU/dL    Nitrites NEGATIVE  NEG      Leukocyte Esterase MODERATE (A) NEG      WBC 10-20 0 - 4 /hpf    RBC 5-10 0 - 5 /hpf    Epithelial cells MODERATE (A) FEW /lpf    Bacteria 2+ (A) NEG /hpf    Other: Renal Epithelial cells Present         Radiologic Studies -   No orders to display         Medical Decision Making   I am the first provider for this patient. Records Reviewed: I reviewed our electronic medical record system for any past medical records that were available that may contribute to the patient's current condition, including their PMH, surgical history, social and family history. Reviewed the nursing notes and vital signs from today's visit. Vital Signs-Reviewed the patient's vital signs.     Pulse Oximetry Analysis - 100% on RA    Cardiac Monitor:   Rate: 64 bpm  Rhythm: Normal Sinus Rhythm      Provider Notes (Medical Decision Making):   Ms. CardenasCristianodanielito Wiseman is a pleasant elderly lady who is here with symptoms suggestive of acute cystitis. Labs are unremarkable, vital signs are also normal. No evidence of a more widespread infection, such as pyelo or urosepsis. Ok to dc on abx with close f/up. ED Course:   Initial assessment performed. The patients presenting problems have been discussed, and they are in agreement with the care plan formulated and outlined with them. I have encouraged them to ask questions as they arise throughout their visit. Diagnosis     Clinical Impression:   1. Acute cystitis without hematuria        Attestation:  I personally performed the services described in this documentation on this date 7/29/2019 for patient Afia Childress. Salima Acevedo MD    Please note that this dictation was completed with [employer], the computer voice recognition software. Quite often unanticipated grammatical, syntax, homophones, and other interpretive errors are inadvertently transcribed by the computer software. Please disregard these errors. Please excuse any errors that have escaped final proofreading.

## 2019-09-02 ENCOUNTER — PATIENT OUTREACH (OUTPATIENT)
Dept: INTERNAL MEDICINE CLINIC | Age: 84
End: 2019-09-02

## 2019-09-03 NOTE — PROGRESS NOTES
Patient listed on [facility] Case Load Report. Patient's symptoms are stable at this time. Patient/family has the ability to self-manage. Care management goals have been completed at this time. No further nurse navigator follow up scheduled.   Episode Resolved  Name from Team List  Goals closed/completed  Case closed DISCHARGE

## 2021-08-03 PROBLEM — I10 HYPERTENSION: Status: RESOLVED | Noted: 2021-08-03 | Resolved: 2021-08-03
